# Patient Record
Sex: MALE | Race: WHITE | Employment: FULL TIME | URBAN - METROPOLITAN AREA
[De-identification: names, ages, dates, MRNs, and addresses within clinical notes are randomized per-mention and may not be internally consistent; named-entity substitution may affect disease eponyms.]

---
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
## 2019-04-01 ENCOUNTER — OFFICE VISIT (OUTPATIENT)
Dept: FAMILY MEDICINE | Facility: CLINIC | Age: 34
End: 2019-04-01
Payer: COMMERCIAL

## 2019-04-01 VITALS
TEMPERATURE: 96.5 F | HEART RATE: 74 BPM | SYSTOLIC BLOOD PRESSURE: 118 MMHG | DIASTOLIC BLOOD PRESSURE: 78 MMHG | RESPIRATION RATE: 20 BRPM | OXYGEN SATURATION: 98 % | BODY MASS INDEX: 28.79 KG/M2 | HEIGHT: 73 IN | WEIGHT: 217.2 LBS

## 2019-04-01 DIAGNOSIS — Z00.00 WELL ADULT EXAM: Primary | ICD-10-CM

## 2019-04-01 DIAGNOSIS — Z71.6 ENCOUNTER FOR TOBACCO USE CESSATION COUNSELING: ICD-10-CM

## 2019-04-01 DIAGNOSIS — Z63.8 STRESS DUE TO FAMILY TENSION: ICD-10-CM

## 2019-04-01 DIAGNOSIS — F43.22 ADJUSTMENT DISORDER WITH ANXIOUS MOOD: ICD-10-CM

## 2019-04-01 LAB
ALBUMIN SERPL-MCNC: 3.7 G/DL (ref 3.4–5)
ALP SERPL-CCNC: 73 U/L (ref 40–150)
ALT SERPL W P-5'-P-CCNC: 17 U/L (ref 0–70)
ANION GAP SERPL CALCULATED.3IONS-SCNC: 5 MMOL/L (ref 3–14)
AST SERPL W P-5'-P-CCNC: 19 U/L (ref 0–45)
BILIRUB SERPL-MCNC: 0.4 MG/DL (ref 0.2–1.3)
BUN SERPL-MCNC: 8 MG/DL (ref 7–30)
CALCIUM SERPL-MCNC: 8.7 MG/DL (ref 8.5–10.1)
CHLORIDE SERPL-SCNC: 109 MMOL/L (ref 94–109)
CHOLEST SERPL-MCNC: 157 MG/DL
CO2 SERPL-SCNC: 27 MMOL/L (ref 20–32)
CREAT SERPL-MCNC: 0.97 MG/DL (ref 0.66–1.25)
GFR SERPL CREATININE-BSD FRML MDRD: >90 ML/MIN/{1.73_M2}
GLUCOSE SERPL-MCNC: 92 MG/DL (ref 70–99)
HDLC SERPL-MCNC: 28 MG/DL
LDLC SERPL CALC-MCNC: 93 MG/DL
NONHDLC SERPL-MCNC: 129 MG/DL
POTASSIUM SERPL-SCNC: 3.8 MMOL/L (ref 3.4–5.3)
PROT SERPL-MCNC: 6.5 G/DL (ref 6.8–8.8)
SODIUM SERPL-SCNC: 141 MMOL/L (ref 133–144)
TRIGL SERPL-MCNC: 180 MG/DL

## 2019-04-01 PROCEDURE — 36415 COLL VENOUS BLD VENIPUNCTURE: CPT | Performed by: FAMILY MEDICINE

## 2019-04-01 PROCEDURE — 99395 PREV VISIT EST AGE 18-39: CPT | Performed by: FAMILY MEDICINE

## 2019-04-01 PROCEDURE — 80053 COMPREHEN METABOLIC PANEL: CPT | Performed by: FAMILY MEDICINE

## 2019-04-01 PROCEDURE — 80061 LIPID PANEL: CPT | Performed by: FAMILY MEDICINE

## 2019-04-01 RX ORDER — NICOTINE 21 MG/24HR
1 PATCH, TRANSDERMAL 24 HOURS TRANSDERMAL EVERY 24 HOURS
Qty: 21 PATCH | Refills: 1 | Status: SHIPPED | OUTPATIENT
Start: 2019-04-01

## 2019-04-01 RX ORDER — FAMOTIDINE 20 MG/1
20 TABLET, FILM COATED ORAL 2 TIMES DAILY PRN
COMMUNITY

## 2019-04-01 SDOH — SOCIAL STABILITY - SOCIAL INSECURITY: OTHER SPECIFIED PROBLEMS RELATED TO PRIMARY SUPPORT GROUP: Z63.8

## 2019-04-01 SDOH — HEALTH STABILITY: MENTAL HEALTH: HOW OFTEN DO YOU HAVE A DRINK CONTAINING ALCOHOL?: NEVER

## 2019-04-01 ASSESSMENT — ENCOUNTER SYMPTOMS
DIARRHEA: 0
HEMATOCHEZIA: 0
CHILLS: 0
DYSURIA: 0
EYE PAIN: 0
HEARTBURN: 1
FEVER: 1
HEMATURIA: 0
WEAKNESS: 0
CONSTIPATION: 0
SORE THROAT: 0
DIZZINESS: 0
ARTHRALGIAS: 0
HEADACHES: 0
JOINT SWELLING: 0
FREQUENCY: 0
MYALGIAS: 0
PALPITATIONS: 0
COUGH: 0
SHORTNESS OF BREATH: 0
ABDOMINAL PAIN: 0
NERVOUS/ANXIOUS: 1
NAUSEA: 0

## 2019-04-01 ASSESSMENT — ANXIETY QUESTIONNAIRES
6. BECOMING EASILY ANNOYED OR IRRITABLE: NOT AT ALL
5. BEING SO RESTLESS THAT IT IS HARD TO SIT STILL: SEVERAL DAYS
2. NOT BEING ABLE TO STOP OR CONTROL WORRYING: NEARLY EVERY DAY
IF YOU CHECKED OFF ANY PROBLEMS ON THIS QUESTIONNAIRE, HOW DIFFICULT HAVE THESE PROBLEMS MADE IT FOR YOU TO DO YOUR WORK, TAKE CARE OF THINGS AT HOME, OR GET ALONG WITH OTHER PEOPLE: NOT DIFFICULT AT ALL
1. FEELING NERVOUS, ANXIOUS, OR ON EDGE: NEARLY EVERY DAY
3. WORRYING TOO MUCH ABOUT DIFFERENT THINGS: NEARLY EVERY DAY
7. FEELING AFRAID AS IF SOMETHING AWFUL MIGHT HAPPEN: SEVERAL DAYS
GAD7 TOTAL SCORE: 13

## 2019-04-01 ASSESSMENT — PATIENT HEALTH QUESTIONNAIRE - PHQ9
SUM OF ALL RESPONSES TO PHQ QUESTIONS 1-9: 10
5. POOR APPETITE OR OVEREATING: MORE THAN HALF THE DAYS

## 2019-04-01 ASSESSMENT — MIFFLIN-ST. JEOR: SCORE: 1974.59

## 2019-04-01 NOTE — LETTER
[facility]  [address] [place] [address]    April 3, 2019    Kendrick Sosa  [address] [place] [id number]          Dear Kendrick,    Your most recent labs are not concerning at this time.  Your Liver function, kidney function and electrolytes are all normal.  You have a healthy cholesterol panel as your LDL (bad cholesterol) is below 100, however your HDL (good cholesterol) is well below 45.  Raise your good cholesterol by increasing your fiber intake, omega-3/6 fatty acids, and get regular exercise.  Please continue your healthy diet and regular exercise regimen discussed in clinic, and please let me know if you have any questions    Enclosed is a copy of your results.     Results for orders placed or performed in visit on 04/01/19   Lipid panel reflex to direct LDL Fasting   Result Value Ref Range    Cholesterol 157 <200 mg/dL    Triglycerides 180 (H) <150 mg/dL    HDL Cholesterol 28 (L) >39 mg/dL    LDL Cholesterol Calculated 93 <100 mg/dL    Non HDL Cholesterol 129 <130 mg/dL   Comprehensive metabolic panel   Result Value Ref Range    Sodium 141 133 - 144 mmol/L    Potassium 3.8 3.4 - 5.3 mmol/L    Chloride 109 94 - 109 mmol/L    Carbon Dioxide 27 20 - 32 mmol/L    Anion Gap 5 3 - 14 mmol/L    Glucose 92 70 - 99 mg/dL    Urea Nitrogen 8 7 - 30 mg/dL    Creatinine 0.97 0.66 - 1.25 mg/dL    GFR Estimate >90 >60 mL/min/[1.73_m2]    GFR Estimate If Black >90 >60 mL/min/[1.73_m2]    Calcium 8.7 8.5 - 10.1 mg/dL    Bilirubin Total 0.4 0.2 - 1.3 mg/dL    Albumin 3.7 3.4 - 5.0 g/dL    Protein Total 6.5 (L) 6.8 - 8.8 g/dL    Alkaline Phosphatase 73 40 - 150 U/L    ALT 17 0 - 70 U/L    AST 19 0 - 45 U/L       If you have any questions or concerns, please call myself or my nurse at [phone].      Sincerely,        Darrion Rivera MD/patel
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
## 2019-04-02 ASSESSMENT — ANXIETY QUESTIONNAIRES: GAD7 TOTAL SCORE: 13

## 2019-05-29 ENCOUNTER — OFFICE VISIT (OUTPATIENT)
Dept: PSYCHOLOGY | Facility: CLINIC | Age: 34
End: 2019-05-29
Attending: FAMILY MEDICINE
Payer: COMMERCIAL

## 2019-05-29 DIAGNOSIS — F43.22 ADJUSTMENT DISORDER WITH ANXIOUS MOOD: Primary | ICD-10-CM

## 2019-05-29 PROCEDURE — 90791 PSYCH DIAGNOSTIC EVALUATION: CPT | Performed by: MARRIAGE & FAMILY THERAPIST
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
## 2019-05-29 NOTE — Clinical Note
Sens, Dr. Rivera!  I met with Kendrick for Diagnostic Assessment/therapy intake.  Plan for therapy will be to focus on increasing calm mindset, along with likely (re-)processing of past loss/trauma.  Thank you for the referral!

## 2019-06-05 ENCOUNTER — OFFICE VISIT (OUTPATIENT)
Dept: PSYCHOLOGY | Facility: CLINIC | Age: 34
End: 2019-06-05
Attending: FAMILY MEDICINE
Payer: COMMERCIAL

## 2019-06-05 DIAGNOSIS — F43.22 ADJUSTMENT DISORDER WITH ANXIOUS MOOD: Primary | ICD-10-CM

## 2019-06-05 PROCEDURE — 90834 PSYTX W PT 45 MINUTES: CPT | Performed by: MARRIAGE & FAMILY THERAPIST

## 2019-06-05 ASSESSMENT — ANXIETY QUESTIONNAIRES
1. FEELING NERVOUS, ANXIOUS, OR ON EDGE: SEVERAL DAYS
7. FEELING AFRAID AS IF SOMETHING AWFUL MIGHT HAPPEN: SEVERAL DAYS
6. BECOMING EASILY ANNOYED OR IRRITABLE: NOT AT ALL
3. WORRYING TOO MUCH ABOUT DIFFERENT THINGS: SEVERAL DAYS
IF YOU CHECKED OFF ANY PROBLEMS ON THIS QUESTIONNAIRE, HOW DIFFICULT HAVE THESE PROBLEMS MADE IT FOR YOU TO DO YOUR WORK, TAKE CARE OF THINGS AT HOME, OR GET ALONG WITH OTHER PEOPLE: NOT DIFFICULT AT ALL
2. NOT BEING ABLE TO STOP OR CONTROL WORRYING: SEVERAL DAYS
GAD7 TOTAL SCORE: 5
5. BEING SO RESTLESS THAT IT IS HARD TO SIT STILL: SEVERAL DAYS

## 2019-06-05 ASSESSMENT — PATIENT HEALTH QUESTIONNAIRE - PHQ9
5. POOR APPETITE OR OVEREATING: NOT AT ALL
SUM OF ALL RESPONSES TO PHQ QUESTIONS 1-9: 2

## 2019-06-05 NOTE — PROGRESS NOTES
Progress Note    Client Name: Kendrick Sosa  Date: 6/5/19         Service Type: Individual  Video Visit: No     Session Start Time: 11:00  Session End Time: 11:52     Session Length: 38-52    Session #: 1    Attendees: Client attended alone     Treatment Plan Last Reviewed: 6/5/19, next due 9/5/19.  PHQ-9 / MILAD-7 : completed.    DATA  Interactive Complexity: No  Crisis: No       Progress Since Last Session (Related to Symptoms / Goals / Homework):   Symptoms: Worsening client reported increasing anxiety.    Homework: Achieved / completed to satisfaction  Completed in session      Episode of Care Goals: Minimal progress - PREPARATION (Decided to change - considering how); Intervened by negotiating a change plan and determining options / strategies for behavior change, identifying triggers, exploring social supports, and working towards setting a date to begin behavior change     Current / Ongoing Stressors and Concerns:   Client reported that he was served divorce papers since last session.  Client reported that, although he wants a divorce, he is not financially ready to engage in the process now (which requires response within 30 days), and he is anxious about losing his assets and custody/visitation with his children.  Client reported that he is working on getting a job.     Treatment Objective(s) Addressed in This Session:   identify some stressors which contribute to feelings of anxiety  Client identified his desired therapy goals.     Intervention:   DBT: taught/reviewed with client focusing on being effective in his response to stress and doing what he can, especially when options are limited.  EMDR: administered Dissociative Experiences Scale (KENNY) assessment to determine client's readiness for engaging in EMDR therapy.  Motivational Interviewing: used circular/Socratic questioning to elicit client's identification of his desired therapy goals.        ASSESSMENT:  "Current Emotional / Mental Status (status of significant symptoms):   Risk status (Self / Other harm or suicidal ideation)   Client denies current fears or concerns for personal safety.   Client denies current or recent suicidal ideation or behaviors.   Client denies current or recent homicidal ideation or behaviors.   Client denies current or recent self injurious behavior or ideation.   Client denies other safety concerns.   Client Client reports there has been a change in risk factors since their last session.  served divorce papers   Client Client reports there has been no change in protective factors since their last session.     A safety and risk management plan has not been developed at this time, however client was given the after-hours number / 911 should there be a change in any of these risk factors.     Appearance:   Appropriate    Eye Contact:   Good    Psychomotor Behavior: Normal    Attitude:   Cooperative    Orientation:   All   Speech    Rate / Production: Normal  Pressured     Volume:  Normal    Mood:    Anxious  Normal   Affect:    Appropriate  Worrisome    Thought Content:  Clear    Thought Form:  Coherent  Logical    Insight:    Fair      Medication Review:   No current psychiatric medications prescribed     Medication Compliance:   NA     Changes in Health Issues:   None reported     Chemical Use Review:   Substance Use: Chemical use reviewed, no active concerns identified      Tobacco Use: Yes, decrease.  Client reports frequency of use \"less cigarettes\". Action  Provided support and affirmation for steps taken towards quiting     Diagnosis:  1. Adjustment disorder with anxious mood        Collateral Reports Completed:   Not Applicable    PLAN: (Client Tasks / Therapist Tasks / Other)  Client agreed to continue to focus on effectively doing what he can in response to stressors with limited options and to complete KENNY assessment to determine his readiness for engaging in EMDR therapy between " now and follow-up session next week.        Cali Navarro, LMFT                                                         ______________________________________________________________________    Treatment Plan    Client's Name: Kendrick Sosa  YOB: 1985    Date: 6/5/19    DSM-V Diagnoses: Adjustment Disorders  309.24 (F43.22) With anxiety  Psychosocial / Contextual Factors: going through divorce from wife, history of loss/trauma, unemployed/no income    WHODAS: 15    Referral / Collaboration:  Referral to another professional/service is not indicated at this time..    Anticipated number of session or this episode of care: 11-20      MeasurableTreatment Goal(s) related to diagnosis / functional impairment(s)  Goal 1: Client will successfully process through past trauma defined as reporting 0 Subjective Units of Distress related to trauma on 0-10 scale and 7 on Validity of (positive) Cognition scale about self on 1-7 scale   I will know I've met my goal when I feel less impacted by my past loss/trauma..       Objective #A (Client Action)    Status: New - Date: 6/5/19      Client will identify past traumatic events/memories which are causing current distress.     Intervention(s)  Therapist will take client's history and facilitate client's identification of targets for EMDR.     Objective #B  Client will complete needed assessment(s) and [facility] resourcing to confirm readiness for EMDR.    Status: New - Date: 6/5/19      Intervention(s)  Therapist will administer Dissociative Experiences Scale, Multidimensional Inventory of Dissociation as needed and complete [facility] resourcing with client.     Objective #C  Client will engage in installing at least 2 EMDR resources.  Status: New - Date: 6/5/19      Intervention(s)  Therapist will complete EMDR resourcing (Container, Remote Control, grounding/progressive muscle relaxation, Light Stream, Inner Advisor) with client.     Objective #D  (Client Action)    Status: New - Date: 6/5/19      Client will engage in reprocessing all past traumatic event/memory targets.     Intervention(s)   Therapist will complete EMDR reprocessing with client.    Goal 2: Client will increase overall baseline calm mindset by 2 points on a 1-10 Likert scale per self-report (10 = optimal calm mindset).    I will know I've met my goal when I feel less anxious.      Objective #A (Client Action)    Status: New - Date: 6/5/19     Client will use cognitive strategies identified in therapy to challenge anxious thoughts.    Intervention(s)  Therapist will teach emotional regulation skills. CBT/REBT ABCD model.    Objective #B  Client will use at least 2 new coping skills for anxiety management in the next 12 weeks.    Status: New - Date: 6/5/19     Intervention(s)  Therapist will teach emotional regulation skills. DBT Core Mindfulness, Distress Tolerance, Emotion Regulation, and Interpersonal Effectiveness skills.    Client has reviewed and agreed to the above plan.      YAN Hooker  June 5, 2019

## 2019-06-06 ASSESSMENT — ANXIETY QUESTIONNAIRES: GAD7 TOTAL SCORE: 5

## 2019-06-13 ENCOUNTER — OFFICE VISIT (OUTPATIENT)
Dept: PSYCHOLOGY | Facility: CLINIC | Age: 34
End: 2019-06-13
Payer: COMMERCIAL

## 2019-06-13 DIAGNOSIS — F43.22 ADJUSTMENT DISORDER WITH ANXIOUS MOOD: Primary | ICD-10-CM

## 2019-06-13 PROCEDURE — 90834 PSYTX W PT 45 MINUTES: CPT | Performed by: MARRIAGE & FAMILY THERAPIST

## 2019-06-13 ASSESSMENT — ANXIETY QUESTIONNAIRES
IF YOU CHECKED OFF ANY PROBLEMS ON THIS QUESTIONNAIRE, HOW DIFFICULT HAVE THESE PROBLEMS MADE IT FOR YOU TO DO YOUR WORK, TAKE CARE OF THINGS AT HOME, OR GET ALONG WITH OTHER PEOPLE: NOT DIFFICULT AT ALL
2. NOT BEING ABLE TO STOP OR CONTROL WORRYING: NOT AT ALL
1. FEELING NERVOUS, ANXIOUS, OR ON EDGE: NOT AT ALL
3. WORRYING TOO MUCH ABOUT DIFFERENT THINGS: NOT AT ALL
6. BECOMING EASILY ANNOYED OR IRRITABLE: NOT AT ALL
GAD7 TOTAL SCORE: 0
5. BEING SO RESTLESS THAT IT IS HARD TO SIT STILL: NOT AT ALL
7. FEELING AFRAID AS IF SOMETHING AWFUL MIGHT HAPPEN: NOT AT ALL

## 2019-06-13 ASSESSMENT — PATIENT HEALTH QUESTIONNAIRE - PHQ9
SUM OF ALL RESPONSES TO PHQ QUESTIONS 1-9: 1
5. POOR APPETITE OR OVEREATING: NOT AT ALL

## 2019-06-13 NOTE — PROGRESS NOTES
Progress Note    Client Name: Kendrick Sosa  Date: 6/13/19         Service Type: Individual  Video Visit: No     Session Start Time: 11:00  Session End Time: 11:52     Session Length: 38-52    Session #: 2    Attendees: Client attended alone     Treatment Plan Last Reviewed: 6/5/19, next due 9/5/19.  PHQ-9 / MILAD-7 : completed.    DATA  Interactive Complexity: No  Crisis: No       Progress Since Last Session (Related to Symptoms / Goals / Homework):   Symptoms: Improving client reported decreased PHQ-9 and MILAD-7 scores    Homework: Achieved / completed to satisfaction      Episode of Care Goals: Satisfactory progress - ACTION (Actively working towards change); Intervened by reinforcing change plan / affirming steps taken     Current / Ongoing Stressors and Concerns:   Client reported that he followed through with contacting a , and his is unsure if they will be able to help at this point.  Client reported that he has reconnected with an ex-girlfriend, and is unsure if he is rushing that.  Client reported that he got the job he applied for, and that he will have to transition back to working 2nd or 3rd shift.     Treatment Objective(s) Addressed in This Session:   identify some strategies to more effectively address stressors     Intervention:   DBT: taught/reviewed with client using Pros and Cons List skill to aid in his decision-making process regarding pursuing a relationship with his ex-girlfriend.  EMDR: oriented client to EMDR therapy protocol.        ASSESSMENT: Current Emotional / Mental Status (status of significant symptoms):   Risk status (Self / Other harm or suicidal ideation)   Client denies current fears or concerns for personal safety.   Client denies current or recent suicidal ideation or behaviors.   Client denies current or recent homicidal ideation or behaviors.   Client denies current or recent self injurious behavior or ideation.   Client denies  "other safety concerns.   Client Client reports there has been no change in risk factors since their last session.     Client Client reports there has been a chance in protective factors since their last session.  got new job   A safety and risk management plan has not been developed at this time, however client was given the after-hours number / 911 should there be a change in any of these risk factors.     Appearance:   Appropriate    Eye Contact:   Good    Psychomotor Behavior: Normal    Attitude:   Cooperative    Orientation:   All   Speech    Rate / Production: Normal     Volume:  Normal    Mood:    Anxious  Normal   Affect:    Appropriate  Worrisome    Thought Content:  Clear    Thought Form:  Coherent  Logical    Insight:    Fair      Medication Review:   No current psychiatric medications prescribed     Medication Compliance:   NA     Changes in Health Issues:   None reported     Chemical Use Review:   Substance Use: Chemical use reviewed, no active concerns identified      Tobacco Use: Yes, decrease.  Client reports frequency of use \"decreasing\". Action  Provided support and affirmation for steps taken towards quiting     Diagnosis:  1. Adjustment disorder with anxious mood        Collateral Reports Completed:   Not Applicable    PLAN: (Client Tasks / Therapist Tasks / Other)  Client agreed to complete Pros and Cons List skill to aid in his decision-making regarding pursuing relationship with ex-girlfriend between now and follow-up session next week.        Cali Navarro, LMFT                                                         ______________________________________________________________________    Treatment Plan    Client's Name: Kendrick Sosa  YOB: 1985    Date: 6/5/19    DSM-V Diagnoses: Adjustment Disorders  309.24 (F43.22) With anxiety  Psychosocial / Contextual Factors: going through divorce from wife, history of loss/trauma, unemployed/no income    WHODAS: 15    Referral / " Collaboration:  Referral to another professional/service is not indicated at this time..    Anticipated number of session or this episode of care: 11-20      MeasurableTreatment Goal(s) related to diagnosis / functional impairment(s)  Goal 1: Client will successfully process through past trauma defined as reporting 0 Subjective Units of Distress related to trauma on 0-10 scale and 7 on Validity of (positive) Cognition scale about self on 1-7 scale   I will know I've met my goal when I feel less impacted by my past loss/trauma..       Objective #A (Client Action)    Status: New - Date: 6/5/19      Client will identify past traumatic events/memories which are causing current distress.     Intervention(s)  Therapist will take client's history and facilitate client's identification of targets for EMDR.     Objective #B  Client will complete needed assessment(s) and [facility] resourcing to confirm readiness for EMDR.    Status: New - Date: 6/5/19      Intervention(s)  Therapist will administer Dissociative Experiences Scale, Multidimensional Inventory of Dissociation as needed and complete [facility] resourcing with client.     Objective #C  Client will engage in installing at least 2 EMDR resources.  Status: New - Date: 6/5/19      Intervention(s)  Therapist will complete EMDR resourcing (Container, Remote Control, grounding/progressive muscle relaxation, Light Stream, Inner Advisor) with client.     Objective #D (Client Action)    Status: New - Date: 6/5/19      Client will engage in reprocessing all past traumatic event/memory targets.     Intervention(s)   Therapist will complete EMDR reprocessing with client.    Goal 2: Client will increase overall baseline calm mindset by 2 points on a 1-10 Likert scale per self-report (10 = optimal calm mindset).    I will know I've met my goal when I feel less anxious.      Objective #A (Client Action)    Status: New - Date: 6/5/19     Client will use cognitive  strategies identified in therapy to challenge anxious thoughts.    Intervention(s)  Therapist will teach emotional regulation skills. CBT/REBT ABCD model.    Objective #B  Client will use at least 2 new coping skills for anxiety management in the next 12 weeks.    Status: New - Date: 6/5/19     Intervention(s)  Therapist will teach emotional regulation skills. DBT Core Mindfulness, Distress Tolerance, Emotion Regulation, and Interpersonal Effectiveness skills.    Client has reviewed and agreed to the above plan.      Cali Navarro, LMFT  June 13, 2019

## 2019-06-14 ASSESSMENT — ANXIETY QUESTIONNAIRES: GAD7 TOTAL SCORE: 0

## 2019-06-20 ENCOUNTER — OFFICE VISIT (OUTPATIENT)
Dept: PSYCHOLOGY | Facility: CLINIC | Age: 34
End: 2019-06-20
Payer: COMMERCIAL

## 2019-06-20 DIAGNOSIS — F43.22 ADJUSTMENT DISORDER WITH ANXIOUS MOOD: Primary | ICD-10-CM

## 2019-06-20 PROCEDURE — 90834 PSYTX W PT 45 MINUTES: CPT | Performed by: MARRIAGE & FAMILY THERAPIST

## 2019-06-20 ASSESSMENT — ANXIETY QUESTIONNAIRES
7. FEELING AFRAID AS IF SOMETHING AWFUL MIGHT HAPPEN: NOT AT ALL
2. NOT BEING ABLE TO STOP OR CONTROL WORRYING: SEVERAL DAYS
3. WORRYING TOO MUCH ABOUT DIFFERENT THINGS: NOT AT ALL
GAD7 TOTAL SCORE: 3
5. BEING SO RESTLESS THAT IT IS HARD TO SIT STILL: NOT AT ALL
6. BECOMING EASILY ANNOYED OR IRRITABLE: SEVERAL DAYS
1. FEELING NERVOUS, ANXIOUS, OR ON EDGE: SEVERAL DAYS
IF YOU CHECKED OFF ANY PROBLEMS ON THIS QUESTIONNAIRE, HOW DIFFICULT HAVE THESE PROBLEMS MADE IT FOR YOU TO DO YOUR WORK, TAKE CARE OF THINGS AT HOME, OR GET ALONG WITH OTHER PEOPLE: NOT DIFFICULT AT ALL

## 2019-06-20 ASSESSMENT — PATIENT HEALTH QUESTIONNAIRE - PHQ9
5. POOR APPETITE OR OVEREATING: NOT AT ALL
SUM OF ALL RESPONSES TO PHQ QUESTIONS 1-9: 0

## 2019-06-20 NOTE — PROGRESS NOTES
Progress Note    Client Name: Kendrick Sosa  Date: 6/20/19         Service Type: Individual  Video Visit: No     Session Start Time: 11:17  Session End Time: 11:55     Session Length: 38-52    Session #: 3    Attendees: Client attended alone     Treatment Plan Last Reviewed: 6/5/19, next due 9/5/19.  PHQ-9 / MILAD-7 : completed.    DATA  Interactive Complexity: No  Crisis: No       Progress Since Last Session (Related to Symptoms / Goals / Homework):   Symptoms: Client reported decreased PHQ-9 and increased MILAD-7 scores    Homework: Achieved / completed to satisfaction      Episode of Care Goals: Minimal progress - ACTION (Actively working towards change); Intervened by reinforcing change plan / affirming steps taken     Current / Ongoing Stressors and Concerns:   Client reported that he never heard back from the  he contacted, so he is feeling stressed about having to complete he response to his wife's divorce filing this weekend.  Client reported that he completed his Pros and Cons List skill regarding moving forward with his ex-girlfriend, and that they have agreed to take it slow.  Client reported that he is very tired from his new job.     Treatment Objective(s) Addressed in This Session:   identify some strategies to more effectively address stressors     Intervention:   CBT: reviewed with client focusing on doing what he can (e.g. his legal response) and letting go of outcomes outside of his control.  Solution Focused: reviewed with client strategies for completing his divorce response with his mother's help and quitting smoking.        ASSESSMENT: Current Emotional / Mental Status (status of significant symptoms):   Risk status (Self / Other harm or suicidal ideation)   Client denies current fears or concerns for personal safety.   Client denies current or recent suicidal ideation or behaviors.   Client denies current or recent homicidal ideation or  "behaviors.   Client denies current or recent self injurious behavior or ideation.   Client denies other safety concerns.   Client Client reports there has been no change in risk factors since their last session.     Client Client reports there has been a chance in protective factors since their last session.  got new job   A safety and risk management plan has not been developed at this time, however client was given the after-hours number / 911 should there be a change in any of these risk factors.     Appearance:   Appropriate    Eye Contact:   Good    Psychomotor Behavior: Normal    Attitude:   Cooperative    Orientation:   All   Speech    Rate / Production: Normal     Volume:  Normal    Mood:    Anxious  Normal   Affect:    Appropriate  Worrisome    Thought Content:  Clear    Thought Form:  Coherent  Logical    Insight:    Fair      Medication Review:   No current psychiatric medications prescribed     Medication Compliance:   NA     Changes in Health Issues:   None reported     Chemical Use Review:   Substance Use: Chemical use reviewed, no active concerns identified      Tobacco Use: Yes, decrease.  Client reports frequency of use \"decreasing\". Action  Provided support and affirmation for steps taken towards quiting     Diagnosis:  1. Adjustment disorder with anxious mood        Collateral Reports Completed:   Not Applicable    PLAN: (Client Tasks / Therapist Tasks / Other)  Client agreed to work on doing what he can (e.g. responding to divorce filing with his mother's help) and quitting smoking between now and follow-up session next week.        Cali Navarro LMFT                                                         ______________________________________________________________________    Treatment Plan    Client's Name: Kendrick Sosa  YOB: 1985    Date: 6/5/19    DSM-V Diagnoses: Adjustment Disorders  309.24 (F43.22) With anxiety  Psychosocial / Contextual Factors: going through " divorce from wife, history of loss/trauma, unemployed/no income    WHODAS: 15    Referral / Collaboration:  Referral to another professional/service is not indicated at this time..    Anticipated number of session or this episode of care: 11-20      MeasurableTreatment Goal(s) related to diagnosis / functional impairment(s)  Goal 1: Client will successfully process through past trauma defined as reporting 0 Subjective Units of Distress related to trauma on 0-10 scale and 7 on Validity of (positive) Cognition scale about self on 1-7 scale   I will know I've met my goal when I feel less impacted by my past loss/trauma..       Objective #A (Client Action)    Status: New - Date: 6/5/19      Client will identify past traumatic events/memories which are causing current distress.     Intervention(s)  Therapist will take client's history and facilitate client's identification of targets for EMDR.     Objective #B  Client will complete needed assessment(s) and [facility] resourcing to confirm readiness for EMDR.    Status: New - Date: 6/5/19      Intervention(s)  Therapist will administer Dissociative Experiences Scale, Multidimensional Inventory of Dissociation as needed and complete [facility] EM[facility] resourcing with client.     Objective #C  Client will engage in installing at least 2 EMDR resources.  Status: New - Date: 6/5/19      Intervention(s)  Therapist will complete EMDR resourcing (Container, Remote Control, grounding/progressive muscle relaxation, Light Stream, Inner Advisor) with client.     Objective #D (Client Action)    Status: New - Date: 6/5/19      Client will engage in reprocessing all past traumatic event/memory targets.     Intervention(s)   Therapist will complete EMDR reprocessing with client.    Goal 2: Client will increase overall baseline calm mindset by 2 points on a 1-10 Likert scale per self-report (10 = optimal calm mindset).    I will know I've met my goal when I feel less anxious.       Objective #A (Client Action)    Status: New - Date: 6/5/19     Client will use cognitive strategies identified in therapy to challenge anxious thoughts.    Intervention(s)  Therapist will teach emotional regulation skills. CBT/REBT ABCD model.    Objective #B  Client will use at least 2 new coping skills for anxiety management in the next 12 weeks.    Status: New - Date: 6/5/19     Intervention(s)  Therapist will teach emotional regulation skills. DBT Core Mindfulness, Distress Tolerance, Emotion Regulation, and Interpersonal Effectiveness skills.    Client has reviewed and agreed to the above plan.      Cali Navarro, LMFT  June 20, 2019

## 2019-06-21 ASSESSMENT — ANXIETY QUESTIONNAIRES: GAD7 TOTAL SCORE: 3

## 2019-06-24 ENCOUNTER — OFFICE VISIT (OUTPATIENT)
Dept: PSYCHOLOGY | Facility: CLINIC | Age: 34
End: 2019-06-24
Payer: COMMERCIAL

## 2019-06-24 DIAGNOSIS — F43.22 ADJUSTMENT DISORDER WITH ANXIOUS MOOD: Primary | ICD-10-CM

## 2019-06-24 PROCEDURE — 90785 PSYTX COMPLEX INTERACTIVE: CPT | Performed by: MARRIAGE & FAMILY THERAPIST

## 2019-06-24 PROCEDURE — 90834 PSYTX W PT 45 MINUTES: CPT | Performed by: MARRIAGE & FAMILY THERAPIST

## 2019-06-24 ASSESSMENT — ANXIETY QUESTIONNAIRES
3. WORRYING TOO MUCH ABOUT DIFFERENT THINGS: NOT AT ALL
6. BECOMING EASILY ANNOYED OR IRRITABLE: NOT AT ALL
1. FEELING NERVOUS, ANXIOUS, OR ON EDGE: NOT AT ALL
GAD7 TOTAL SCORE: 0
5. BEING SO RESTLESS THAT IT IS HARD TO SIT STILL: NOT AT ALL
2. NOT BEING ABLE TO STOP OR CONTROL WORRYING: NOT AT ALL
7. FEELING AFRAID AS IF SOMETHING AWFUL MIGHT HAPPEN: NOT AT ALL

## 2019-06-24 ASSESSMENT — PATIENT HEALTH QUESTIONNAIRE - PHQ9
SUM OF ALL RESPONSES TO PHQ QUESTIONS 1-9: 0
5. POOR APPETITE OR OVEREATING: NOT AT ALL

## 2019-06-24 NOTE — PROGRESS NOTES
Progress Note    Client Name: Kendrick Sosa  Date: 6/24/19         Service Type: Individual  Video Visit: No     Session Start Time: 12:00  Session End Time: 12:47     Session Length: 38-52    Session #: 4    Attendees: Client attended alone     Treatment Plan Last Reviewed: 6/5/19, next due 9/5/19.  PHQ-9 / MILAD-7 : completed.    DATA  Interactive Complexity: Yes, visit entailed Interactive Complexity evidenced by:  -Use of play equipment, physical devices,  or  to overcome barriers to diagnostic or therapeutic interaction with a patient who is not fluent in the same language or who has not developed or lost expressive or receptive language skills to use or understand typical language  Crisis: No       Progress Since Last Session (Related to Symptoms / Goals / Homework):   Symptoms: Improving Client reported decreased MILAD-7 score    Homework: Achieved / completed to satisfaction      Episode of Care Goals: Satisfactory progress - ACTION (Actively working towards change); Intervened by reinforcing change plan / affirming steps taken        Current / Ongoing Stressors and Concerns:   Client reported that he completed his response to his wife's divorce filing this past weekend with his mother's help, about which he feels anxious as he doesn't know if he did it correctly.  Client reported that he is anxious about looking forward to having to face his ex-wife in court, but recognizes the need to do so for his kids' sakes.  Client opted to engage in EMDR Container resource installation during today's session.     Treatment Objective(s) Addressed in This Session:   Client will complete needed assessment(s) and [facility] EM[facility] resourcing to confirm readiness for EMDR.     Intervention:   EMDR: installed [facility] resource.        ASSESSMENT: Current Emotional / Mental Status (status of significant symptoms):   Risk status (Self / Other harm or suicidal  "ideation)   Client denies current fears or concerns for personal safety.   Client denies current or recent suicidal ideation or behaviors.   Client denies current or recent homicidal ideation or behaviors.   Client denies current or recent self injurious behavior or ideation.   Client denies other safety concerns.   Client Client reports there has been no change in risk factors since their last session.     Client Client reports there has been a chance in protective factors since their last session.  got new job   A safety and risk management plan has not been developed at this time, however client was given the after-hours number / 911 should there be a change in any of these risk factors.     Appearance:   Appropriate    Eye Contact:   Good    Psychomotor Behavior: Normal    Attitude:   Cooperative    Orientation:   All   Speech    Rate / Production: Normal     Volume:  Normal    Mood:    Anxious  Normal   Affect:    Appropriate  Worrisome    Thought Content:  Clear    Thought Form:  Coherent  Logical    Insight:    Fair      Medication Review:   No current psychiatric medications prescribed     Medication Compliance:   NA     Changes in Health Issues:   None reported     Chemical Use Review:   Substance Use: Chemical use reviewed, no active concerns identified      Tobacco Use: Yes, decrease.  Client reports frequency of use \"decreasing\". Action  Provided support and affirmation for steps taken towards quiting     Diagnosis:  1. Adjustment disorder with anxious mood        Collateral Reports Completed:   Not Applicable    PLAN: (Client Tasks / Therapist Tasks / Other)  Client agreed to engage in EMDR [facility] resourcing at least once daily and additionally as needed for distress between now and follow-up session next week.        Cali Navarro, LMFT                                                         ______________________________________________________________________    Treatment " Plan    Client's Name: Kendrick Sosa  YOB: 1985    Date: 6/5/19    DSM-V Diagnoses: Adjustment Disorders  309.24 (F43.22) With anxiety  Psychosocial / Contextual Factors: going through divorce from wife, history of loss/trauma, unemployed/no income    WHODAS: 15    Referral / Collaboration:  Referral to another professional/service is not indicated at this time..    Anticipated number of session or this episode of care: 11-20      MeasurableTreatment Goal(s) related to diagnosis / functional impairment(s)  Goal 1: Client will successfully process through past trauma defined as reporting 0 Subjective Units of Distress related to trauma on 0-10 scale and 7 on Validity of (positive) Cognition scale about self on 1-7 scale   I will know I've met my goal when I feel less impacted by my past loss/trauma..       Objective #A (Client Action)    Status: New - Date: 6/5/19      Client will identify past traumatic events/memories which are causing current distress.     Intervention(s)  Therapist will take client's history and facilitate client's identification of targets for EMDR.     Objective #B  Client will complete needed assessment(s) and [facility] resourcing to confirm readiness for EMDR.    Status: New - Date: 6/5/19      Intervention(s)  Therapist will administer Dissociative Experiences Scale, Multidimensional Inventory of Dissociation as needed and complete [facility] EM[facility] resourcing with client.     Objective #C  Client will engage in installing at least 2 EMDR resources.  Status: New - Date: 6/5/19      Intervention(s)  Therapist will complete EMDR resourcing (Container, Remote Control, grounding/progressive muscle relaxation, Light Stream, Inner Advisor) with client.     Objective #D (Client Action)    Status: New - Date: 6/5/19      Client will engage in reprocessing all past traumatic event/memory targets.     Intervention(s)   Therapist will complete EMDR reprocessing with client.    Goal 2:  Client will increase overall baseline calm mindset by 2 points on a 1-10 Likert scale per self-report (10 = optimal calm mindset).    I will know I've met my goal when I feel less anxious.      Objective #A (Client Action)    Status: New - Date: 6/5/19     Client will use cognitive strategies identified in therapy to challenge anxious thoughts.    Intervention(s)  Therapist will teach emotional regulation skills. CBT/REBT ABCD model.    Objective #B  Client will use at least 2 new coping skills for anxiety management in the next 12 weeks.    Status: New - Date: 6/5/19     Intervention(s)  Therapist will teach emotional regulation skills. DBT Core Mindfulness, Distress Tolerance, Emotion Regulation, and Interpersonal Effectiveness skills.    Client has reviewed and agreed to the above plan.      YAN Hooker  June 23, 2019

## 2019-06-25 ASSESSMENT — ANXIETY QUESTIONNAIRES: GAD7 TOTAL SCORE: 0

## 2019-07-05 ENCOUNTER — OFFICE VISIT (OUTPATIENT)
Dept: PSYCHOLOGY | Facility: CLINIC | Age: 34
End: 2019-07-05
Payer: COMMERCIAL

## 2019-07-05 DIAGNOSIS — F43.22 ADJUSTMENT DISORDER WITH ANXIOUS MOOD: Primary | ICD-10-CM

## 2019-07-05 PROCEDURE — 90834 PSYTX W PT 45 MINUTES: CPT | Performed by: MARRIAGE & FAMILY THERAPIST

## 2019-07-05 PROCEDURE — 90785 PSYTX COMPLEX INTERACTIVE: CPT | Performed by: MARRIAGE & FAMILY THERAPIST

## 2019-07-05 ASSESSMENT — ANXIETY QUESTIONNAIRES
1. FEELING NERVOUS, ANXIOUS, OR ON EDGE: NOT AT ALL
3. WORRYING TOO MUCH ABOUT DIFFERENT THINGS: NOT AT ALL
2. NOT BEING ABLE TO STOP OR CONTROL WORRYING: NOT AT ALL
GAD7 TOTAL SCORE: 0
6. BECOMING EASILY ANNOYED OR IRRITABLE: NOT AT ALL
7. FEELING AFRAID AS IF SOMETHING AWFUL MIGHT HAPPEN: NOT AT ALL
5. BEING SO RESTLESS THAT IT IS HARD TO SIT STILL: NOT AT ALL

## 2019-07-05 ASSESSMENT — PATIENT HEALTH QUESTIONNAIRE - PHQ9
5. POOR APPETITE OR OVEREATING: NOT AT ALL
SUM OF ALL RESPONSES TO PHQ QUESTIONS 1-9: 0

## 2019-07-05 NOTE — PROGRESS NOTES
"                                           Progress Note    Client Name: Kendrick Sosa  Date: 7/5/19         Service Type: Individual  Video Visit: No     Session Start Time: 1:30  Session End Time: 2:15     Session Length: 38-52    Session #: 5    Attendees: Client attended alone     Treatment Plan Last Reviewed: 6/5/19, next due 9/5/19.  PHQ-9 / MILAD-7 : completed.    DATA  Interactive Complexity: Yes, visit entailed Interactive Complexity evidenced by:  -Use of play equipment, physical devices,  or  to overcome barriers to diagnostic or therapeutic interaction with a patient who is not fluent in the same language or who has not developed or lost expressive or receptive language skills to use or understand typical language  Crisis: No       Progress Since Last Session (Related to Symptoms / Goals / Homework):   Symptoms: No change client is stable    Homework: Achieved / completed to satisfaction      Episode of Care Goals: Satisfactory progress - ACTION (Actively working towards change); Intervened by reinforcing change plan / affirming steps taken        Current / Ongoing Stressors and Concerns:   Client reported that he is planning to try and settle his divorce \"in a fair manner.\"  Client reported that he has had minimal contact with his kids, which has impacted him emotionally.  Client opted to engage in EMDR Container resource installation during today's session.     Treatment Objective(s) Addressed in This Session:   Client will engage in installing at least 2 EMDR resources.     Intervention:   EMDR: installed Container resource.        ASSESSMENT: Current Emotional / Mental Status (status of significant symptoms):   Risk status (Self / Other harm or suicidal ideation)   Client denies current fears or concerns for personal safety.   Client denies current or recent suicidal ideation or behaviors.   Client denies current or recent homicidal ideation or behaviors.   Client denies current or " recent self injurious behavior or ideation.   Client denies other safety concerns.   Client Client reports there has been no change in risk factors since their last session.     Client Client reports there has been a chance in protective factors since their last session.  got new job   A safety and risk management plan has not been developed at this time, however client was given the after-hours number / 911 should there be a change in any of these risk factors.     Appearance:   Appropriate    Eye Contact:   Good    Psychomotor Behavior: Normal    Attitude:   Cooperative    Orientation:   All   Speech    Rate / Production: Normal     Volume:  Normal    Mood:    Anxious  Normal   Affect:    Appropriate  Worrisome    Thought Content:  Clear    Thought Form:  Coherent  Logical    Insight:    Fair      Medication Review:   No current psychiatric medications prescribed     Medication Compliance:   NA     Changes in Health Issues:   None reported     Chemical Use Review:   Substance Use: Chemical use reviewed, no active concerns identified      Tobacco Use: No change in amount of tobacco use since last session.  Action    Diagnosis:  1. Adjustment disorder with anxious mood        Collateral Reports Completed:   Not Applicable    PLAN: (Client Tasks / Therapist Tasks / Other)  Client agreed to engage in EMDR Container resourcing at least once daily and additionally as needed for distress between now and follow-up session next week.        Cali Navarro, [facility]                                                         ______________________________________________________________________    Treatment Plan    Client's Name: Kendrick Sosa  YOB: 1985    Date: 6/5/19    DSM-V Diagnoses: Adjustment Disorders  309.24 (F43.22) With anxiety  Psychosocial / Contextual Factors: going through divorce from wife, history of loss/trauma, unemployed/no income    WHODAS: 15    Referral / Collaboration:  Referral to  another professional/service is not indicated at this time..    Anticipated number of session or this episode of care: 11-20      MeasurableTreatment Goal(s) related to diagnosis / functional impairment(s)  Goal 1: Client will successfully process through past trauma defined as reporting 0 Subjective Units of Distress related to trauma on 0-10 scale and 7 on Validity of (positive) Cognition scale about self on 1-7 scale   I will know I've met my goal when I feel less impacted by my past loss/trauma..       Objective #A (Client Action)    Status: New - Date: 6/5/19      Client will identify past traumatic events/memories which are causing current distress.     Intervention(s)  Therapist will take client's history and facilitate client's identification of targets for EMDR.     Objective #B  Client will complete needed assessment(s) and [facility] EM[facility] resourcing to confirm readiness for EMDR.    Status: New - Date: 6/5/19      Intervention(s)  Therapist will administer Dissociative Experiences Scale, Multidimensional Inventory of Dissociation as needed and complete [facility] EMDR resourcing with client.     Objective #C  Client will engage in installing at least 2 EMDR resources.  Status: New - Date: 6/5/19      Intervention(s)  Therapist will complete EMDR resourcing (Container, Remote Control, grounding/progressive muscle relaxation, Light Stream, Inner Advisor) with client.     Objective #D (Client Action)    Status: New - Date: 6/5/19      Client will engage in reprocessing all past traumatic event/memory targets.     Intervention(s)   Therapist will complete EMDR reprocessing with client.    Goal 2: Client will increase overall baseline calm mindset by 2 points on a 1-10 Likert scale per self-report (10 = optimal calm mindset).    I will know I've met my goal when I feel less anxious.      Objective #A (Client Action)    Status: New - Date: 6/5/19     Client will use cognitive strategies identified in therapy to  challenge anxious thoughts.    Intervention(s)  Therapist will teach emotional regulation skills. CBT/REBT ABCD model.    Objective #B  Client will use at least 2 new coping skills for anxiety management in the next 12 weeks.    Status: New - Date: 6/5/19     Intervention(s)  Therapist will teach emotional regulation skills. DBT Core Mindfulness, Distress Tolerance, Emotion Regulation, and Interpersonal Effectiveness skills.    Client has reviewed and agreed to the above plan.      YAN Hooker  July 5, 2019

## 2019-07-06 ASSESSMENT — ANXIETY QUESTIONNAIRES: GAD7 TOTAL SCORE: 0

## 2019-07-11 ENCOUNTER — OFFICE VISIT (OUTPATIENT)
Dept: PSYCHOLOGY | Facility: CLINIC | Age: 34
End: 2019-07-11
Payer: COMMERCIAL

## 2019-07-11 DIAGNOSIS — F43.22 ADJUSTMENT DISORDER WITH ANXIOUS MOOD: Primary | ICD-10-CM

## 2019-07-11 PROCEDURE — 90834 PSYTX W PT 45 MINUTES: CPT | Performed by: MARRIAGE & FAMILY THERAPIST

## 2019-07-11 ASSESSMENT — ANXIETY QUESTIONNAIRES
GAD7 TOTAL SCORE: 0
1. FEELING NERVOUS, ANXIOUS, OR ON EDGE: NOT AT ALL
3. WORRYING TOO MUCH ABOUT DIFFERENT THINGS: NOT AT ALL
GAD7 TOTAL SCORE: 0
6. BECOMING EASILY ANNOYED OR IRRITABLE: NOT AT ALL
7. FEELING AFRAID AS IF SOMETHING AWFUL MIGHT HAPPEN: NOT AT ALL
4. TROUBLE RELAXING: NOT AT ALL
7. FEELING AFRAID AS IF SOMETHING AWFUL MIGHT HAPPEN: NOT AT ALL
5. BEING SO RESTLESS THAT IT IS HARD TO SIT STILL: NOT AT ALL
GAD7 TOTAL SCORE: 0
2. NOT BEING ABLE TO STOP OR CONTROL WORRYING: NOT AT ALL

## 2019-07-11 ASSESSMENT — PATIENT HEALTH QUESTIONNAIRE - PHQ9
SUM OF ALL RESPONSES TO PHQ QUESTIONS 1-9: 0
10. IF YOU CHECKED OFF ANY PROBLEMS, HOW DIFFICULT HAVE THESE PROBLEMS MADE IT FOR YOU TO DO YOUR WORK, TAKE CARE OF THINGS AT HOME, OR GET ALONG WITH OTHER PEOPLE: NOT DIFFICULT AT ALL
SUM OF ALL RESPONSES TO PHQ QUESTIONS 1-9: 0

## 2019-07-12 ASSESSMENT — ANXIETY QUESTIONNAIRES: GAD7 TOTAL SCORE: 0

## 2019-07-12 ASSESSMENT — PATIENT HEALTH QUESTIONNAIRE - PHQ9: SUM OF ALL RESPONSES TO PHQ QUESTIONS 1-9: 0

## 2019-07-12 NOTE — PROGRESS NOTES
Progress Note    Client Name: Kendrick Sosa  Date: 7/11/19         Service Type: Individual  Video Visit: No     Session Start Time: 1:00  Session End Time: 1:45     Session Length: 38-52    Session #: 6    Attendees: Client attended alone     Treatment Plan Last Reviewed: 6/5/19, next due 9/5/19.  PHQ-9 / MILAD-7 : completed.    DATA  Interactive Complexity: No  Crisis: No       Progress Since Last Session (Related to Symptoms / Goals / Homework):   Symptoms: No change client is stable    Homework: Partially completed      Episode of Care Goals: Satisfactory progress - ACTION (Actively working towards change); Intervened by reinforcing change plan / affirming steps taken        Current / Ongoing Stressors and Concerns:   Client reported being tired due to a busy work schedule.  Client reported that he is working with his wife's  to finalize his divorce.  Client reported that he wants to take his relationship with his girlfriend slowly.     Treatment Objective(s) Addressed in This Session:   identify some strategies to more effectively address stressors     Intervention:   Solution Focused: reviewed with client plan for finalizing his divorce in an effective manner.        ASSESSMENT: Current Emotional / Mental Status (status of significant symptoms):   Risk status (Self / Other harm or suicidal ideation)   Client denies current fears or concerns for personal safety.   Client denies current or recent suicidal ideation or behaviors.   Client denies current or recent homicidal ideation or behaviors.   Client denies current or recent self injurious behavior or ideation.   Client denies other safety concerns.   Client Client reports there has been no change in risk factors since their last session.     Client Patient reports there has been no change in protective factors since their last session.     A safety and risk management plan has not been developed at this time,  however client was given the after-hours number / 911 should there be a change in any of these risk factors.     Appearance:   Appropriate    Eye Contact:   Good    Psychomotor Behavior: Normal    Attitude:   Cooperative    Orientation:   All   Speech    Rate / Production: Normal     Volume:  Normal    Mood:    Anxious  Normal   Affect:    Appropriate  Worrisome    Thought Content:  Clear    Thought Form:  Coherent  Logical    Insight:    Fair      Medication Review:   No current psychiatric medications prescribed     Medication Compliance:   NA     Changes in Health Issues:   None reported     Chemical Use Review:   Substance Use: Chemical use reviewed, no active concerns identified      Tobacco Use: No change in amount of tobacco use since last session.  Action    Diagnosis:  1. Adjustment disorder with anxious mood        Collateral Reports Completed:   Not Applicable    PLAN: (Client Tasks / Therapist Tasks / Other)  Client agreed to continue to work on finalizing his divorce in an effective manner between now and follow-up session next week.        Cali Navarro, LMFT                                                         ______________________________________________________________________    Treatment Plan    Client's Name: Kendrick Sosa  YOB: 1985    Date: 6/5/19    DSM-V Diagnoses: Adjustment Disorders  309.24 (F43.22) With anxiety  Psychosocial / Contextual Factors: going through divorce from wife, history of loss/trauma, unemployed/no income    WHODAS: 15    Referral / Collaboration:  Referral to another professional/service is not indicated at this time..    Anticipated number of session or this episode of care: 11-20      MeasurableTreatment Goal(s) related to diagnosis / functional impairment(s)  Goal 1: Client will successfully process through past trauma defined as reporting 0 Subjective Units of Distress related to trauma on 0-10 scale and 7 on Validity of (positive)  Cognition scale about self on 1-7 scale   I will know I've met my goal when I feel less impacted by my past loss/trauma..       Objective #A (Client Action)    Status: New - Date: 6/5/19      Client will identify past traumatic events/memories which are causing current distress.     Intervention(s)  Therapist will take client's history and facilitate client's identification of targets for EMDR.     Objective #B  Client will complete needed assessment(s) and [facility] EM[facility] resourcing to confirm readiness for EMDR.    Status: New - Date: 6/5/19      Intervention(s)  Therapist will administer Dissociative Experiences Scale, Multidimensional Inventory of Dissociation as needed and complete [facility] EMDR resourcing with client.     Objective #C  Client will engage in installing at least 2 EMDR resources.  Status: New - Date: 6/5/19      Intervention(s)  Therapist will complete EMDR resourcing (Container, Remote Control, grounding/progressive muscle relaxation, Light Stream, Inner Advisor) with client.     Objective #D (Client Action)    Status: New - Date: 6/5/19      Client will engage in reprocessing all past traumatic event/memory targets.     Intervention(s)   Therapist will complete EMDR reprocessing with client.    Goal 2: Client will increase overall baseline calm mindset by 2 points on a 1-10 Likert scale per self-report (10 = optimal calm mindset).    I will know I've met my goal when I feel less anxious.      Objective #A (Client Action)    Status: New - Date: 6/5/19     Client will use cognitive strategies identified in therapy to challenge anxious thoughts.    Intervention(s)  Therapist will teach emotional regulation skills. CBT/REBT ABCD model.    Objective #B  Client will use at least 2 new coping skills for anxiety management in the next 12 weeks.    Status: New - Date: 6/5/19     Intervention(s)  Therapist will teach emotional regulation skills. DBT Core Mindfulness, Distress Tolerance, Emotion  Regulation, and Interpersonal Effectiveness skills.    Client has reviewed and agreed to the above plan.      Cali Navarro, LMFT  July 11, 2019

## 2019-08-02 ENCOUNTER — OFFICE VISIT (OUTPATIENT)
Dept: PSYCHOLOGY | Facility: CLINIC | Age: 34
End: 2019-08-02
Payer: COMMERCIAL

## 2019-08-02 DIAGNOSIS — F43.22 ADJUSTMENT DISORDER WITH ANXIOUS MOOD: Primary | ICD-10-CM

## 2019-08-02 PROCEDURE — 90834 PSYTX W PT 45 MINUTES: CPT | Performed by: MARRIAGE & FAMILY THERAPIST

## 2019-08-02 ASSESSMENT — ANXIETY QUESTIONNAIRES
7. FEELING AFRAID AS IF SOMETHING AWFUL MIGHT HAPPEN: NOT AT ALL
6. BECOMING EASILY ANNOYED OR IRRITABLE: NOT AT ALL
1. FEELING NERVOUS, ANXIOUS, OR ON EDGE: SEVERAL DAYS
4. TROUBLE RELAXING: NOT AT ALL
GAD7 TOTAL SCORE: 1
7. FEELING AFRAID AS IF SOMETHING AWFUL MIGHT HAPPEN: NOT AT ALL
2. NOT BEING ABLE TO STOP OR CONTROL WORRYING: NOT AT ALL
GAD7 TOTAL SCORE: 1
GAD7 TOTAL SCORE: 1
3. WORRYING TOO MUCH ABOUT DIFFERENT THINGS: NOT AT ALL
5. BEING SO RESTLESS THAT IT IS HARD TO SIT STILL: NOT AT ALL

## 2019-08-02 ASSESSMENT — PATIENT HEALTH QUESTIONNAIRE - PHQ9
SUM OF ALL RESPONSES TO PHQ QUESTIONS 1-9: 1
SUM OF ALL RESPONSES TO PHQ QUESTIONS 1-9: 1
10. IF YOU CHECKED OFF ANY PROBLEMS, HOW DIFFICULT HAVE THESE PROBLEMS MADE IT FOR YOU TO DO YOUR WORK, TAKE CARE OF THINGS AT HOME, OR GET ALONG WITH OTHER PEOPLE: NOT DIFFICULT AT ALL

## 2019-08-03 ASSESSMENT — ANXIETY QUESTIONNAIRES: GAD7 TOTAL SCORE: 1

## 2019-08-03 ASSESSMENT — PATIENT HEALTH QUESTIONNAIRE - PHQ9: SUM OF ALL RESPONSES TO PHQ QUESTIONS 1-9: 1

## 2019-08-05 NOTE — PROGRESS NOTES
Progress Note    Client Name: Kendrick Sosa  Date: 8/2/19         Service Type: Individual  Video Visit: No     Session Start Time: 1:30  Session End Time: 2:22     Session Length: 38-52    Session #: 7    Attendees: Client attended alone     Treatment Plan Last Reviewed: 6/5/19, next due 9/5/19.  PHQ-9 / MILAD-7 : completed.    DATA  Interactive Complexity: No  Crisis: No       Progress Since Last Session (Related to Symptoms / Goals / Homework):   Symptoms: Worsening client reported increased PHQ-9 and MILAD-7 scores.    Homework: Partially completed      Episode of Care Goals: Minimal progress - ACTION (Actively working towards change); Intervened by reinforcing change plan / affirming steps taken        Current / Ongoing Stressors and Concerns:   Client reported that he has continued working with his wife's  to finalize his divorce.  Client reported that he now has contact with his children, which is also stressful to him as it requires contact with his gnof-iy-ty-ex-wife.  Client reported continuing to be tired due to his busy work schedule, but that work is going well.     Treatment Objective(s) Addressed in This Session:   identify some strategies to more effectively address stressors     Intervention:   CBT: reviewed with client focusing on positives (contact with his children) vs. negatives (contact with wife).  DBT: reviewed with client engaging in enjoyable activities (e.g. building models) to improve his mood.        ASSESSMENT: Current Emotional / Mental Status (status of significant symptoms):   Risk status (Self / Other harm or suicidal ideation)   Client denies current fears or concerns for personal safety.   Client denies current or recent suicidal ideation or behaviors.   Client denies current or recent homicidal ideation or behaviors.   Client denies current or recent self injurious behavior or ideation.   Client denies other safety concerns.   Client  Client reports there has been no change in risk factors since their last session.     Client Patient reports there has been no change in protective factors since their last session.     A safety and risk management plan has not been developed at this time, however client was given the after-hours number / 911 should there be a change in any of these risk factors.     Appearance:   Appropriate    Eye Contact:   Good    Psychomotor Behavior: Normal    Attitude:   Cooperative    Orientation:   All   Speech    Rate / Production: Normal     Volume:  Normal    Mood:    Anxious  Normal   Affect:    Appropriate  Worrisome    Thought Content:  Clear    Thought Form:  Coherent  Logical    Insight:    Fair      Medication Review:   No current psychiatric medications prescribed     Medication Compliance:   NA     Changes in Health Issues:   None reported     Chemical Use Review:   Substance Use: Chemical use reviewed, no active concerns identified      Tobacco Use: No change in amount of tobacco use since last session.  Action    Diagnosis:  1. Adjustment disorder with anxious mood        Collateral Reports Completed:   Not Applicable    PLAN: (Client Tasks / Therapist Tasks / Other)  Client agreed to work on reframing his focus from negatives to positives and to engage in enjoyable activities to improve mood between now and follow-up session next week.        Cali Navarro LMFT                                                         ______________________________________________________________________    Treatment Plan    Client's Name: Kendrick Sosa  YOB: 1985    Date: 6/5/19    DSM-V Diagnoses: Adjustment Disorders  309.24 (F43.22) With anxiety  Psychosocial / Contextual Factors: going through divorce from wife, history of loss/trauma, unemployed/no income    WHODAS: 15    Referral / Collaboration:  Referral to another professional/service is not indicated at this time..    Anticipated number of  session or this episode of care: 11-20      MeasurableTreatment Goal(s) related to diagnosis / functional impairment(s)  Goal 1: Client will successfully process through past trauma defined as reporting 0 Subjective Units of Distress related to trauma on 0-10 scale and 7 on Validity of (positive) Cognition scale about self on 1-7 scale   I will know I've met my goal when I feel less impacted by my past loss/trauma..       Objective #A (Client Action)    Status: New - Date: 6/5/19      Client will identify past traumatic events/memories which are causing current distress.     Intervention(s)  Therapist will take client's history and facilitate client's identification of targets for EMDR.     Objective #B  Client will complete needed assessment(s) and [facility] resourcing to confirm readiness for EMDR.    Status: New - Date: 6/5/19      Intervention(s)  Therapist will administer Dissociative Experiences Scale, Multidimensional Inventory of Dissociation as needed and complete [facility] resourcing with client.     Objective #C  Client will engage in installing at least 2 EMDR resources.  Status: New - Date: 6/5/19      Intervention(s)  Therapist will complete EMDR resourcing (Container, Remote Control, grounding/progressive muscle relaxation, Light Stream, Inner Advisor) with client.     Objective #D (Client Action)    Status: New - Date: 6/5/19      Client will engage in reprocessing all past traumatic event/memory targets.     Intervention(s)   Therapist will complete EMDR reprocessing with client.    Goal 2: Client will increase overall baseline calm mindset by 2 points on a 1-10 Likert scale per self-report (10 = optimal calm mindset).    I will know I've met my goal when I feel less anxious.      Objective #A (Client Action)    Status: New - Date: 6/5/19     Client will use cognitive strategies identified in therapy to challenge anxious thoughts.    Intervention(s)  Therapist will teach emotional  regulation skills. CBT/REBT ABCD model.    Objective #B  Client will use at least 2 new coping skills for anxiety management in the next 12 weeks.    Status: New - Date: 6/5/19     Intervention(s)  Therapist will teach emotional regulation skills. DBT Core Mindfulness, Distress Tolerance, Emotion Regulation, and Interpersonal Effectiveness skills.    Client has reviewed and agreed to the above plan.      YAN Hooker  August 2, 2019

## 2019-08-08 ENCOUNTER — OFFICE VISIT (OUTPATIENT)
Dept: PSYCHOLOGY | Facility: CLINIC | Age: 34
End: 2019-08-08
Payer: COMMERCIAL

## 2019-08-08 DIAGNOSIS — F43.22 ADJUSTMENT DISORDER WITH ANXIOUS MOOD: Primary | ICD-10-CM

## 2019-08-08 PROCEDURE — 90834 PSYTX W PT 45 MINUTES: CPT | Performed by: MARRIAGE & FAMILY THERAPIST

## 2019-08-08 NOTE — PROGRESS NOTES
Progress Note    Client Name: Kendrick Sosa  Date: 8/8/19         Service Type: Individual  Video Visit: No     Session Start Time: 1:00  Session End Time: 1:52     Session Length: 38-52    Session #: 8    Attendees: Client attended alone     Treatment Plan Last Reviewed: 6/5/19, next due 9/5/19.  PHQ-9 / MILAD-7 : completed.    DATA  Interactive Complexity: No  Crisis: No       Progress Since Last Session (Related to Symptoms / Goals / Homework):   Symptoms: Improving client reported decreased PHQ-9 score.    Homework: Partially completed       Episode of Care Goals: Satisfactory progress - ACTION (Actively working towards change); Intervened by reinforcing change plan / affirming steps taken        Current / Ongoing Stressors and Concerns:   Client reported that he is trying to get a vehicle for his transportation to work with the upcoming seasonal change, but that he doesn't have enough saved up yet and his sister--who is offering to see him a car--isn't responding to him, so he feels anxious about this.  Client reported that he continues to have contact with his children, which is still stressful to him when he has contact with his bvpu-wa-yh-ex-wife, at which point he ends the phone call.  Client reported continued low energy due to his busy work schedule, but that work continues to go well.      Treatment Objective(s) Addressed in This Session:   identify some strategies to more effectively address stressors     Intervention:   Solution Focused: reviewed with client budgeting for his car.        ASSESSMENT: Current Emotional / Mental Status (status of significant symptoms):   Risk status (Self / Other harm or suicidal ideation)   Client denies current fears or concerns for personal safety.   Client denies current or recent suicidal ideation or behaviors.   Client denies current or recent homicidal ideation or behaviors.   Client denies current or recent self injurious  behavior or ideation.   Client denies other safety concerns.   Client Client reports there has been no change in risk factors since their last session.     Client Patient reports there has been no change in protective factors since their last session.     A safety and risk management plan has not been developed at this time, however client was given the after-hours number / 911 should there be a change in any of these risk factors.     Appearance:   Appropriate    Eye Contact:   Good    Psychomotor Behavior: Normal    Attitude:   Cooperative    Orientation:   All   Speech    Rate / Production: Normal     Volume:  Normal    Mood:    Anxious  Normal   Affect:    Appropriate  Worrisome    Thought Content:  Clear    Thought Form:  Coherent  Logical    Insight:    Good      Medication Review:   No current psychiatric medications prescribed     Medication Compliance:   NA     Changes in Health Issues:   None reported     Chemical Use Review:   Substance Use: Chemical use reviewed, no active concerns identified      Tobacco Use: No change in amount of tobacco use since last session.  Action    Diagnosis:  1. Adjustment disorder with anxious mood        Collateral Reports Completed:   Not Applicable    PLAN: (Client Tasks / Therapist Tasks / Other)  Client agreed to work on budgeting for his car between now and follow-up session in three weeks.        Cali Navarro LMFT                                                         ______________________________________________________________________    Treatment Plan    Client's Name: Kendrick Sosa  YOB: 1985    Date: 6/5/19    DSM-V Diagnoses: Adjustment Disorders  309.24 (F43.22) With anxiety  Psychosocial / Contextual Factors: going through divorce from wife, history of loss/trauma, unemployed/no income    WHODAS: 15    Referral / Collaboration:  Referral to another professional/service is not indicated at this time..    Anticipated number of session  or this episode of care: 11-20      MeasurableTreatment Goal(s) related to diagnosis / functional impairment(s)  Goal 1: Client will successfully process through past trauma defined as reporting 0 Subjective Units of Distress related to trauma on 0-10 scale and 7 on Validity of (positive) Cognition scale about self on 1-7 scale   I will know I've met my goal when I feel less impacted by my past loss/trauma..       Objective #A (Client Action)    Status: New - Date: 6/5/19      Client will identify past traumatic events/memories which are causing current distress.     Intervention(s)  Therapist will take client's history and facilitate client's identification of targets for EMDR.     Objective #B  Client will complete needed assessment(s) and [facility] resourcing to confirm readiness for EMDR.    Status: New - Date: 6/5/19      Intervention(s)  Therapist will administer Dissociative Experiences Scale, Multidimensional Inventory of Dissociation as needed and complete [facility] resourcing with client.     Objective #C  Client will engage in installing at least 2 EMDR resources.  Status: New - Date: 6/5/19      Intervention(s)  Therapist will complete EMDR resourcing (Container, Remote Control, grounding/progressive muscle relaxation, Light Stream, Inner Advisor) with client.     Objective #D (Client Action)    Status: New - Date: 6/5/19      Client will engage in reprocessing all past traumatic event/memory targets.     Intervention(s)   Therapist will complete EMDR reprocessing with client.    Goal 2: Client will increase overall baseline calm mindset by 2 points on a 1-10 Likert scale per self-report (10 = optimal calm mindset).    I will know I've met my goal when I feel less anxious.      Objective #A (Client Action)    Status: New - Date: 6/5/19     Client will use cognitive strategies identified in therapy to challenge anxious thoughts.    Intervention(s)  Therapist will teach emotional regulation  skills. CBT/REBT ABCD model.    Objective #B  Client will use at least 2 new coping skills for anxiety management in the next 12 weeks.    Status: New - Date: 6/5/19     Intervention(s)  Therapist will teach emotional regulation skills. DBT Core Mindfulness, Distress Tolerance, Emotion Regulation, and Interpersonal Effectiveness skills.    Client has reviewed and agreed to the above plan.      YAN Hooker  August 8, 2019

## 2019-08-09 ASSESSMENT — PATIENT HEALTH QUESTIONNAIRE - PHQ9: SUM OF ALL RESPONSES TO PHQ QUESTIONS 1-9: 0

## 2019-08-18 ENCOUNTER — TRANSFERRED RECORDS (OUTPATIENT)
Dept: HEALTH INFORMATION MANAGEMENT | Facility: CLINIC | Age: 34
End: 2019-08-18

## 2019-08-23 NOTE — PROGRESS NOTES
Subjective     Kendrick Sosa is a 33 year old male who presents to clinic today for the following health issues:    HPI   Chief Complaint   Patient presents with     Referral     Urology for vasectomy and would like more information about procedure     Patient would like to get a vasectomy done and wants to discuss procedure and obtain a urology referral.    BP Readings from Last 3 Encounters:   08/27/19 106/72   04/01/19 118/78    Wt Readings from Last 3 Encounters:   08/27/19 99.1 kg (218 lb 6.4 oz)   04/01/19 98.5 kg (217 lb 3.2 oz)                    Reviewed and updated as needed this visit by Provider  Tobacco  Allergies  Meds  Problems  Med Hx  Surg Hx  Fam Hx         Review of Systems   ROS COMP: Constitutional, HEENT, cardiovascular, pulmonary, gi and gu systems are negative, except as otherwise noted.      Objective    /72   Pulse 78   Temp 97.3  F (36.3  C) (Oral)   Resp 18   Wt 99.1 kg (218 lb 6.4 oz)   SpO2 100%   BMI 29.04 kg/m    Body mass index is 29.04 kg/m .  Physical Exam   GENERAL: healthy, alert and no distress  EYES: Eyes grossly normal to inspection, PERRL and conjunctivae and sclerae normal  NECK: no adenopathy, no asymmetry, masses, or scars and thyroid normal to palpation  SKIN: no suspicious lesions or rashes  PSYCH: mentation appears normal, affect normal/bright          Assessment & Plan       ICD-10-CM    1. Encounter for other general counseling or advice on contraception Z30.09 UROLOGY ADULT REFERRAL     Procedure discussed, referral placed for patient to see urology to discuss further.       Follow up if symptoms worsen or fail to improve.   Darrion Godinez MD  [facility]

## 2019-08-27 ENCOUNTER — OFFICE VISIT (OUTPATIENT)
Dept: FAMILY MEDICINE | Facility: CLINIC | Age: 34
End: 2019-08-27
Payer: COMMERCIAL

## 2019-08-27 VITALS
DIASTOLIC BLOOD PRESSURE: 72 MMHG | RESPIRATION RATE: 18 BRPM | TEMPERATURE: 97.3 F | WEIGHT: 218.4 LBS | BODY MASS INDEX: 29.04 KG/M2 | HEART RATE: 78 BPM | SYSTOLIC BLOOD PRESSURE: 106 MMHG | OXYGEN SATURATION: 100 %

## 2019-08-27 DIAGNOSIS — Z30.09 ENCOUNTER FOR OTHER GENERAL COUNSELING OR ADVICE ON CONTRACEPTION: Primary | ICD-10-CM

## 2019-08-27 PROCEDURE — 99213 OFFICE O/P EST LOW 20 MIN: CPT | Performed by: FAMILY MEDICINE

## 2019-08-27 RX ORDER — CLINDAMYCIN HCL 150 MG
CAPSULE ORAL
COMMUNITY
Start: 2019-08-18 | End: 2019-08-28

## 2019-08-27 NOTE — PATIENT INSTRUCTIONS
Patient Education     Having a Vasectomy: Before, During, and After the Procedure     The cut ends of the vas may be tied, closed with a clip, or sealed by heat (cauterized).   Vasectomy is an outpatient procedure. This means you can go home the same day. It can be done in a doctor s office, clinic, or hospital. Your doctor will talk with you about how to get ready for surgery. He or she will also discuss the possible risks and complications with you. After the procedure, follow your doctor s advice for recovery.  Getting ready for surgery  Your doctor will talk with you about getting ready for surgery. You may be asked to do the following:    Sign a consent form. This must be done at least a few days before surgery. It gives your doctor permission to do the procedure. It also states that a vasectomy is not guaranteed to make you sterile.    Don t take aspirin, ibuprofen, or naproxen for 2 weeks before surgery. These medicines can cause bleeding after the procedure. Also, tell your doctor if you take any medicines, supplements, or herbal remedies.    Tell your doctor if you ve had any scrotal surgery in the past.    Arrange for an adult family member or friend to give you a ride home after surgery.    Shower and clean your scrotum the day of surgery. Your doctor may also ask you to shave your scrotum.    Bring a jock strap (athletic supporter) or pair of snug cotton briefs to the doctor s office or hospital.    Eat no more than a light snack before surgery.  During surgery  The entire procedure usually lasts less than 30 minutes.    You ll be asked to undress and lie on a table.    You may be given medicine to help you relax. To prevent pain during surgery, you ll be given an injection of pain medicine in your scrotum or lower groin.    Once the area is numb, the doctor makes one or two small incisions in the scrotum. This may be done with a scalpel or with a pointed clamp (no-scalpel method).    The vas deferens  are lifted through the incision and cut. The provider seals off the ends of the vas deferens using one of several methods.    If needed, the incision is closed with stitches.    You can rest for a while until you re ready to go home.  Recovering at home  For about a week, your scrotum may look bruised and slightly swollen. You may also have a small amount of bloody discharge from the incision. This is normal.  To help make your recovery more comfortable, follow the tips below.    Stay off your feet as much as possible for the first 2 days. Try to lie flat on a bed or sofa.    Wear an athletic supporter or snug cotton briefs for support.    Reduce swelling by using an ice pack or bag of frozen peas wrapped in a thin towel. Put the ice pack or towel on your scrotum.    Take medicines with acetaminophen to relieve any discomfort. Don t use aspirin, ibuprofen, or naproxen.    Wait 48 hours before bathing.    Avoid heavy lifting or exercise for 7 days.    Ask your doctor how long to wait before having sex again. Remember: You must use another form of birth control until you re completely sterile.  When to seek medical care  Call your doctor if you notice any of the following after surgery:    Increasing pain or swelling in your scrotum    A large black-and-blue area, or a growing lump    Fever or chills    Increasing redness or drainage of the incision    Trouble urinating   Sex after vasectomy  Vasectomy doesn t change your sexual function. So when you start having sex again, it should feel the same as before. A vasectomy also shouldn t affect your relationship with your partner. It s important to remember, though, that you won t become sterile right away. It will take time before you can have sex without the need for birth control.    Until you re sterile: After a vasectomy, some active sperm still remain in your semen. It will take time and many ejaculations before the sperm are completely gone. During this period,  you must use another birth control method to prevent pregnancy. To make sure no sperm are left in your semen, you ll need to have one or more semen exams. You usually collect a semen sample at home and bring it to a lab. The sample is then checked under a microscope. You re sterile only when these samples show no evidence of sperm. Ask your doctor whether additional follow-up is needed.    After you re sterile: After your doctor tells you you re sterile, you no longer need to use any form of birth control. You re free to have sex without the fear of unwanted pregnancy. But a vasectomy does not protect you from sexually transmitted diseases (STDs). If you have more than one sex partner, be sure to practice safer sex by using condoms.  Date Last Reviewed: 1/1/2017 2000-2018 [employer]. [address] [place], [place] [id number]. All rights reserved. This information is not intended as a substitute for professional medical care. Always follow your healthcare professional's instructions.           Patient Education     Understanding Vasectomy  Vasectomy is a simple, safe procedure that makes a man sterile (unable to father a child). It is the most effective birth control method for men.  Your reproductive system  For pregnancy to occur, a man s sperm (male reproductive cells) must join with a woman s egg. To understand how a vasectomy works, you need to know how sperm are produced, stored, and released by the body:    The urethra is the tube in the center of the penis. It transports both urine and semen. When you have an orgasm, semen is ejaculated out of the urethra.    The seminal vesicles and the prostate gland secrete fluids called semen. This sticky, white fluid helps nourish sperm and carry them along.    The epididymis is a coiled tube that holds the sperm while they mature.    The scrotum is a pouch of skin that contains the testes.    The testes are glands that produce sperm and male  hormones.    The vas deferens are tubes that carry the sperm from the epididymis to the penis.    Sperm (shown magnified) carry genetic material.     How a vasectomy works  During the procedure, the 2 vas deferens are cut and sealed off. This prevents sperm from traveling from the testes to the penis. It is the only change in your reproductive system. The testes still produce sperm. But since the sperm have nowhere to go, they die and are absorbed by your body. Only a very small amount of semen is made up of sperm. So after a vasectomy, your semen won t look or feel any different.  Keep in mind  After a vasectomy, some active sperm still remain in the reproductive system. It will take about 3 months and numerous ejaculations before the semen is completely free of sperm. Until then, you ll need to use another form of birth control.   Date Last Reviewed: 1/1/2017 2000-2018 The [employer]. [address] [place] [address]. All rights reserved. This information is not intended as a substitute for professional medical care. Always follow your healthcare professional's instructions.           Patient Education     Vasectomy: Risks and Complications  A vasectomy is an outpatient procedure. This means you ll go home the same day. It s done in a doctor s office, clinic, or hospital. Before your procedure, you ll be asked to read and sign a consent form. This form states you re aware of the possible risks and complications. It also says that you understand that the procedure, though most often successful, can t promise to make you sterile. Be sure you have all your questions answered before you sign this form. Below is a list of risks and possible complications of the procedure.  Risks and possible complications of vasectomy  Vasectomy is safe. But it does have risks. They include the following:    Bleeding or infection    Sperm granuloma. This is a small, harmless lump. It may form where the vas  deferens is sealed off.    Sperm buildup (congestion). This may cause soreness in the testes. Anti-inflammatory medicine can provide relief.    Epididymitis. This is inflammation that may cause scrotal aching. It often goes away without treatment. Anti-inflammatory medicine can provide relief.    Reconnection of the vas deferens. This can occur in rare cases. It makes you fertile again. This may result in an unplanned pregnancy.    Sperm antibodies. Developing antibodies is a common response of your body to the absorbed sperm. The antibodies can make you sterile. This is true even if you later try to reverse your vasectomy.    Long-term testicular discomfort. This may occur after surgery. But it s very rare.   Date Last Reviewed: 1/1/2017 2000-2018 The [employer]. [address] [place] [address], [place] [address]. All rights reserved. This information is not intended as a substitute for professional medical care. Always follow your healthcare professional's instructions.           Patient Education     Vasectomy Reversal    Each of your testicles makes sperm (male reproductive cells). Sperm travel from the testicles to the penis through one of 2 tubes called the vas deferens. On the way, sperm mix with other fluids to form semen, which leaves the body during ejaculation. During a vasectomy, each vas deferens is cut, preventing sperm from leaving the body. This makes you sterile (unable to make a woman pregnant). A vasectomy can sometimes be reversed, restoring the flow of sperm out of the body.  How the procedure works  During a vasectomy reversal, the 2 cut ends of the vas deferens are stitched back together. With the sperm pathways restored, sperm can once again travel through the vas deferens and leave the body during ejaculation. You may then be able to father a child.  Preparing for the procedure  You will be given instructions to prepare for the vasectomy reversal. Tell your healthcare provider about any  medicines you take, including aspirin. You may be asked to stop taking some or all of these. On the day of your procedure, bring clean cotton briefs or an athletic support with you.  During the procedure  You ll receive medicine to keep you pain free. You may be awake and relaxed during the procedure. Or, you may be completely asleep. Once the medicine takes effect:    An incision is made in your scrotum.    The cut ends of each vas deferens are lifted out and examined. A section of each cut end may be removed.    The end closer to the testicles is cut until fluid flows freely. This fluid may be looked at under a microscope to see if sperm are present.    The 2 cut ends are stitched together. If needed, the vas may be attached directly to the epididymis (tissue behind the testicle).    When both of the vas deferens are reconnected, the incisions in the scrotum are sutured closed.  After the procedure  You may need to stay in the hospital for several hours. When it s time to go home, have an adult family member or friend drive you. Once you re home:    Take medicine as directed to relieve any pain.    To lessen the chance of swelling, stay off your feet as much as you can for the first day.    Place an ice pack or bag of frozen peas (wrapped in a thin towel) on your scrotum for short amounts of time. This helps reduce swelling.    Wear an athletic support or snug cotton briefs for extra support.    Follow your doctor s instructions for showering and bathing.    Ask your doctor when it s OK to have sex.    Avoid heavy lifting or exercise for at least 2 weeks. Ask your doctor when you can return to work.  Possible risks and complications    Risks associated with anesthesia    Infection (symptoms include fever, chills, drainage from the incision site, and pain)    Internal bleeding of the scrotum (symptoms include increasing pain, excessive swelling, a large black-and-blue area, or a growing lump)    Failure of the  procedure to restore fertility   Date Last Reviewed: 1/1/2017 2000-2018 [employer]. [address] [place] [address], [place] [address]. All rights reserved. This information is not intended as a substitute for professional medical care. Always follow your healthcare professional's instructions.

## 2019-08-28 ENCOUNTER — OFFICE VISIT (OUTPATIENT)
Dept: PSYCHOLOGY | Facility: CLINIC | Age: 34
End: 2019-08-28
Payer: COMMERCIAL

## 2019-08-28 DIAGNOSIS — F43.22 ADJUSTMENT DISORDER WITH ANXIOUS MOOD: Primary | ICD-10-CM

## 2019-08-28 PROCEDURE — 90834 PSYTX W PT 45 MINUTES: CPT | Performed by: MARRIAGE & FAMILY THERAPIST

## 2019-08-28 ASSESSMENT — ANXIETY QUESTIONNAIRES
7. FEELING AFRAID AS IF SOMETHING AWFUL MIGHT HAPPEN: SEVERAL DAYS
2. NOT BEING ABLE TO STOP OR CONTROL WORRYING: SEVERAL DAYS
1. FEELING NERVOUS, ANXIOUS, OR ON EDGE: SEVERAL DAYS
GAD7 TOTAL SCORE: 6
5. BEING SO RESTLESS THAT IT IS HARD TO SIT STILL: SEVERAL DAYS
7. FEELING AFRAID AS IF SOMETHING AWFUL MIGHT HAPPEN: SEVERAL DAYS
3. WORRYING TOO MUCH ABOUT DIFFERENT THINGS: SEVERAL DAYS
GAD7 TOTAL SCORE: 6
GAD7 TOTAL SCORE: 6
4. TROUBLE RELAXING: SEVERAL DAYS
6. BECOMING EASILY ANNOYED OR IRRITABLE: NOT AT ALL

## 2019-08-28 ASSESSMENT — PATIENT HEALTH QUESTIONNAIRE - PHQ9
SUM OF ALL RESPONSES TO PHQ QUESTIONS 1-9: 1
SUM OF ALL RESPONSES TO PHQ QUESTIONS 1-9: 1
10. IF YOU CHECKED OFF ANY PROBLEMS, HOW DIFFICULT HAVE THESE PROBLEMS MADE IT FOR YOU TO DO YOUR WORK, TAKE CARE OF THINGS AT HOME, OR GET ALONG WITH OTHER PEOPLE: SOMEWHAT DIFFICULT

## 2019-08-28 NOTE — PROGRESS NOTES
Progress Note    Client Name: Kendrick Sosa  Date: 8/28/19         Service Type: Individual  Video Visit: No     Session Start Time: 1:00  Session End Time: 1:52     Session Length: 38-52    Session #: 9    Attendees: Client attended alone     Treatment Plan Last Reviewed: 6/5/19, next due 9/5/19.  PHQ-9 / MILAD-7 : completed.    DATA  Interactive Complexity: No  Crisis: No       Progress Since Last Session (Related to Symptoms / Goals / Homework):   Symptoms: Worsening client reported increased PHQ-9 and MILAD-7 scores.    Homework: Partially completed      Episode of Care Goals: Satisfactory progress - ACTION (Actively working towards change); Intervened by reinforcing change plan / affirming steps taken        Current / Ongoing Stressors and Concerns:   Client reported that he is experiencing financial-related stress.  Client reported that he will have to start paying child support soon, and will lose his medical insurance as well as a result of the [id number] divorce.  Client reported that he continues to have anxiety when he has contact with his children as he has to go through contact with his [id number]-ex-wife.      Treatment Objective(s) Addressed in This Session:   identify some strategies to more effectively address stressors     Intervention:   DBT: reviewed with client distraction/increasing positive emotions/experiences skills/strategies for coping with anxiety.        ASSESSMENT: Current Emotional / Mental Status (status of significant symptoms):   Risk status (Self / Other harm or suicidal ideation)   Client denies current fears or concerns for personal safety.   Client denies current or recent suicidal ideation or behaviors.   Client denies current or recent homicidal ideation or behaviors.   Client denies current or recent self injurious behavior or ideation.   Client denies other safety concerns.   Client Client reports there has been no change in  risk factors since their last session.     Client Patient reports there has been no change in protective factors since their last session.     A safety and risk management plan has not been developed at this time, however client was given the after-hours number / 911 should there be a change in any of these risk factors.     Appearance:   Appropriate    Eye Contact:   Good    Psychomotor Behavior: Normal    Attitude:   Cooperative    Orientation:   All   Speech    Rate / Production: Normal     Volume:  Normal    Mood:    Anxious  Normal   Affect:    Appropriate  Worrisome    Thought Content:  Clear    Thought Form:  Coherent  Logical    Insight:    Good      Medication Review:   No current psychiatric medications prescribed     Medication Compliance:   NA     Changes in Health Issues:   None reported     Chemical Use Review:   Substance Use: Chemical use reviewed, no active concerns identified      Tobacco Use: No change in amount of tobacco use since last session.  Action    Diagnosis:  1. Adjustment disorder with anxious mood        Collateral Reports Completed:   Not Applicable    PLAN: (Client Tasks / Therapist Tasks / Other)  Client agreed to work on using distraction/Increase Positive Emotions/Experiences skills/strategies list as needed for his anxiety between now and follow-up session next week.        Cali Navarro, [facility]                                                         ______________________________________________________________________    Treatment Plan    Client's Name: Kendrick Sosa  YOB: 1985    Date: 6/5/19    DSM-V Diagnoses: Adjustment Disorders  309.24 (F43.22) With anxiety  Psychosocial / Contextual Factors: going through divorce from wife, history of loss/trauma, unemployed/no income    WHODAS: 15    Referral / Collaboration:  Referral to another professional/service is not indicated at this time..    Anticipated number of session or this episode of care:  11-20      MeasurableTreatment Goal(s) related to diagnosis / functional impairment(s)  Goal 1: Client will successfully process through past trauma defined as reporting 0 Subjective Units of Distress related to trauma on 0-10 scale and 7 on Validity of (positive) Cognition scale about self on 1-7 scale   I will know I've met my goal when I feel less impacted by my past loss/trauma..       Objective #A (Client Action)    Status: New - Date: 6/5/19      Client will identify past traumatic events/memories which are causing current distress.     Intervention(s)  Therapist will take client's history and facilitate client's identification of targets for EMDR.     Objective #B  Client will complete needed assessment(s) and [facility] EM[facility] resourcing to confirm readiness for EMDR.    Status: New - Date: 6/5/19      Intervention(s)  Therapist will administer Dissociative Experiences Scale, Multidimensional Inventory of Dissociation as needed and complete [facility] resourcing with client.     Objective #C  Client will engage in installing at least 2 EMDR resources.  Status: New - Date: 6/5/19      Intervention(s)  Therapist will complete EMDR resourcing (Container, Remote Control, grounding/progressive muscle relaxation, Light Stream, Inner Advisor) with client.     Objective #D (Client Action)    Status: New - Date: 6/5/19      Client will engage in reprocessing all past traumatic event/memory targets.     Intervention(s)   Therapist will complete EMDR reprocessing with client.    Goal 2: Client will increase overall baseline calm mindset by 2 points on a 1-10 Likert scale per self-report (10 = optimal calm mindset).    I will know I've met my goal when I feel less anxious.      Objective #A (Client Action)    Status: New - Date: 6/5/19     Client will use cognitive strategies identified in therapy to challenge anxious thoughts.    Intervention(s)  Therapist will teach emotional regulation skills. CBT/REBT ABCD  model.    Objective #B  Client will use at least 2 new coping skills for anxiety management in the next 12 weeks.    Status: New - Date: 6/5/19     Intervention(s)  Therapist will teach emotional regulation skills. DBT Core Mindfulness, Distress Tolerance, Emotion Regulation, and Interpersonal Effectiveness skills.    Client has reviewed and agreed to the above plan.      YAN Hooker  August 28, 2019

## 2019-08-29 ASSESSMENT — ANXIETY QUESTIONNAIRES: GAD7 TOTAL SCORE: 6

## 2019-08-29 ASSESSMENT — PATIENT HEALTH QUESTIONNAIRE - PHQ9: SUM OF ALL RESPONSES TO PHQ QUESTIONS 1-9: 1

## 2019-09-03 ENCOUNTER — OFFICE VISIT (OUTPATIENT)
Dept: PSYCHOLOGY | Facility: CLINIC | Age: 34
End: 2019-09-03
Payer: COMMERCIAL

## 2019-09-03 DIAGNOSIS — F43.22 ADJUSTMENT DISORDER WITH ANXIOUS MOOD: Primary | ICD-10-CM

## 2019-09-03 PROCEDURE — 90834 PSYTX W PT 45 MINUTES: CPT | Performed by: MARRIAGE & FAMILY THERAPIST

## 2019-09-03 ASSESSMENT — PATIENT HEALTH QUESTIONNAIRE - PHQ9
SUM OF ALL RESPONSES TO PHQ QUESTIONS 1-9: 0
SUM OF ALL RESPONSES TO PHQ QUESTIONS 1-9: 0
10. IF YOU CHECKED OFF ANY PROBLEMS, HOW DIFFICULT HAVE THESE PROBLEMS MADE IT FOR YOU TO DO YOUR WORK, TAKE CARE OF THINGS AT HOME, OR GET ALONG WITH OTHER PEOPLE: NOT DIFFICULT AT ALL

## 2019-09-03 NOTE — PROGRESS NOTES
"                                           Progress Note    Client Name: Kendrick Sosa  Date: 9/3/19         Service Type: Individual  Video Visit: No     Session Start Time: 2:00  Session End Time: 2:50     Session Length: 38-52    Session #: 10    Attendees: Client attended alone     Treatment Plan Last Reviewed: 6/5/19, next due 9/5/19.  PHQ-9 / MIALD-7 : completed.    DATA  Interactive Complexity: No  Crisis: No       Progress Since Last Session (Related to Symptoms / Goals / Homework):   Symptoms: Improving client reported decreased PHQ-9 score.    Homework: Achieved / completed to satisfaction      Episode of Care Goals: Satisfactory progress - ACTION (Actively working towards change); Intervened by reinforcing change plan / affirming steps taken        Current / Ongoing Stressors and Concerns:   Client reported that he continues to have minimal contact with his children along with contact with his ozzj-qa-ki-ex-wife which \"spoils it.\"  Client reported that divorce just needs to be signed by , and that he must file bankruptcy due to having just discovered unpaid debts in his name.  Client reported continuing to be frustrated with the amount of control that his eglb-ce-gp-ex-wife has over him.     Treatment Objective(s) Addressed in This Session:   use cognitive strategies identified in therapy to challenge anxious thoughts     Intervention:   CBT: reviewed with client focusing on doing what he can, letting go of things outside of his control.        ASSESSMENT: Current Emotional / Mental Status (status of significant symptoms):   Risk status (Self / Other harm or suicidal ideation)   Client denies current fears or concerns for personal safety.   Client denies current or recent suicidal ideation or behaviors.   Client denies current or recent homicidal ideation or behaviors.   Client denies current or recent self injurious behavior or ideation.   Client denies other safety concerns.   Client Client reports " there has been no change in risk factors since their last session.     Client Patient reports there has been no change in protective factors since their last session.     A safety and risk management plan has not been developed at this time, however client was given the after-hours number / 911 should there be a change in any of these risk factors.     Appearance:   Appropriate    Eye Contact:   Good    Psychomotor Behavior: Normal    Attitude:   Cooperative    Orientation:   All   Speech    Rate / Production: Normal     Volume:  Normal    Mood:    Anxious  Normal   Affect:    Appropriate  Worrisome    Thought Content:  Clear    Thought Form:  Coherent  Logical    Insight:    Good      Medication Review:   No current psychiatric medications prescribed     Medication Compliance:   NA     Changes in Health Issues:   None reported     Chemical Use Review:   Substance Use: Chemical use reviewed, no active concerns identified      Tobacco Use: No change in amount of tobacco use since last session.  Action    Diagnosis:  1. Adjustment disorder with anxious mood        Collateral Reports Completed:   Not Applicable    PLAN: (Client Tasks / Therapist Tasks / Other)  Client agreed to work on focusing on doing what he can and letting go of things outside of his control between now and follow-up session next week.        Cali Navarro, [facility]                                                         ______________________________________________________________________    Treatment Plan    Client's Name: Kendrick Sosa  YOB: 1985    Date: 6/5/19    DSM-V Diagnoses: Adjustment Disorders  309.24 (F43.22) With anxiety  Psychosocial / Contextual Factors: going through divorce from wife, history of loss/trauma, unemployed/no income    WHODAS: 15    Referral / Collaboration:  Referral to another professional/service is not indicated at this time..    Anticipated number of session or this episode of care:  11-20      MeasurableTreatment Goal(s) related to diagnosis / functional impairment(s)  Goal 1: Client will successfully process through past trauma defined as reporting 0 Subjective Units of Distress related to trauma on 0-10 scale and 7 on Validity of (positive) Cognition scale about self on 1-7 scale   I will know I've met my goal when I feel less impacted by my past loss/trauma..       Objective #A (Client Action)    Status: New - Date: 6/5/19      Client will identify past traumatic events/memories which are causing current distress.     Intervention(s)  Therapist will take client's history and facilitate client's identification of targets for EMDR.     Objective #B  Client will complete needed assessment(s) and [facility] resourcing to confirm readiness for EMDR.    Status: New - Date: 6/5/19      Intervention(s)  Therapist will administer Dissociative Experiences Scale, Multidimensional Inventory of Dissociation as needed and complete [facility] resourcing with client.     Objective #C  Client will engage in installing at least 2 EMDR resources.  Status: New - Date: 6/5/19      Intervention(s)  Therapist will complete EMDR resourcing (Container, Remote Control, grounding/progressive muscle relaxation, Light Stream, Inner Advisor) with client.     Objective #D (Client Action)    Status: New - Date: 6/5/19      Client will engage in reprocessing all past traumatic event/memory targets.     Intervention(s)   Therapist will complete EMDR reprocessing with client.    Goal 2: Client will increase overall baseline calm mindset by 2 points on a 1-10 Likert scale per self-report (10 = optimal calm mindset).    I will know I've met my goal when I feel less anxious.      Objective #A (Client Action)    Status: New - Date: 6/5/19     Client will use cognitive strategies identified in therapy to challenge anxious thoughts.    Intervention(s)  Therapist will teach emotional regulation skills. CBT/REBT ABCD  model.    Objective #B  Client will use at least 2 new coping skills for anxiety management in the next 12 weeks.    Status: New - Date: 6/5/19     Intervention(s)  Therapist will teach emotional regulation skills. DBT Core Mindfulness, Distress Tolerance, Emotion Regulation, and Interpersonal Effectiveness skills.    Client has reviewed and agreed to the above plan.      YAN Hooker  September 3, 2019

## 2019-09-04 ASSESSMENT — PATIENT HEALTH QUESTIONNAIRE - PHQ9: SUM OF ALL RESPONSES TO PHQ QUESTIONS 1-9: 0

## 2019-09-10 ENCOUNTER — OFFICE VISIT (OUTPATIENT)
Dept: PSYCHOLOGY | Facility: CLINIC | Age: 34
End: 2019-09-10

## 2019-09-10 DIAGNOSIS — F43.22 ADJUSTMENT DISORDER WITH ANXIOUS MOOD: Primary | ICD-10-CM

## 2019-09-10 PROCEDURE — 90834 PSYTX W PT 45 MINUTES: CPT | Performed by: MARRIAGE & FAMILY THERAPIST

## 2019-09-10 ASSESSMENT — PATIENT HEALTH QUESTIONNAIRE - PHQ9
10. IF YOU CHECKED OFF ANY PROBLEMS, HOW DIFFICULT HAVE THESE PROBLEMS MADE IT FOR YOU TO DO YOUR WORK, TAKE CARE OF THINGS AT HOME, OR GET ALONG WITH OTHER PEOPLE: NOT DIFFICULT AT ALL
SUM OF ALL RESPONSES TO PHQ QUESTIONS 1-9: 1
SUM OF ALL RESPONSES TO PHQ QUESTIONS 1-9: 1
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
## 2019-09-11 ASSESSMENT — PATIENT HEALTH QUESTIONNAIRE - PHQ9: SUM OF ALL RESPONSES TO PHQ QUESTIONS 1-9: 1

## 2019-09-27 ENCOUNTER — OFFICE VISIT (OUTPATIENT)
Dept: PSYCHOLOGY | Facility: CLINIC | Age: 34
End: 2019-09-27

## 2019-09-27 DIAGNOSIS — F43.22 ADJUSTMENT DISORDER WITH ANXIOUS MOOD: Primary | ICD-10-CM

## 2019-09-27 PROCEDURE — 90832 PSYTX W PT 30 MINUTES: CPT | Performed by: MARRIAGE & FAMILY THERAPIST

## 2019-09-27 ASSESSMENT — ANXIETY QUESTIONNAIRES
7. FEELING AFRAID AS IF SOMETHING AWFUL MIGHT HAPPEN: NOT AT ALL
7. FEELING AFRAID AS IF SOMETHING AWFUL MIGHT HAPPEN: NOT AT ALL
3. WORRYING TOO MUCH ABOUT DIFFERENT THINGS: NOT AT ALL
4. TROUBLE RELAXING: NOT AT ALL
GAD7 TOTAL SCORE: 0
6. BECOMING EASILY ANNOYED OR IRRITABLE: NOT AT ALL
GAD7 TOTAL SCORE: 0
1. FEELING NERVOUS, ANXIOUS, OR ON EDGE: NOT AT ALL
5. BEING SO RESTLESS THAT IT IS HARD TO SIT STILL: NOT AT ALL
2. NOT BEING ABLE TO STOP OR CONTROL WORRYING: NOT AT ALL
GAD7 TOTAL SCORE: 0

## 2019-09-27 NOTE — PROGRESS NOTES
Progress Note    Client Name: Kendrick Sosa  Date: 9/27/19         Service Type: Individual  Video Visit: No     Session Start Time: 2:00  Session End Time: 2:30     Session Length: 16-37    Session #: 12    Attendees: Client attended alone     Treatment Plan Last Reviewed: 9/1019, next due 12/10/19.  PHQ-9 / MILAD-7 : completed.    DATA  Interactive Complexity: No  Crisis: No       Progress Since Last Session (Related to Symptoms / Goals / Homework):   Symptoms: Improving client reported decreased PHQ-9 and MILAD-7 scores.    Homework: Partially completed       Episode of Care Goals: Satisfactory progress - ACTION (Actively working towards change); Intervened by reinforcing change plan / affirming steps taken        Current / Ongoing Stressors and Concerns:   Client reported experiencing significant financial stress.  Client reported that he was terminated from his iiqg-pr-gr-ex-wife's insurance, so he needs to pay out-of-pocket for his therapy at this time.  Client reported that his child support is also starting.     Treatment Objective(s) Addressed in This Session:   use cognitive strategies identified in therapy to challenge anxious thoughts      Intervention:   CBT: reviewed with client letting go of things outside of his control.        ASSESSMENT: Current Emotional / Mental Status (status of significant symptoms):   Risk status (Self / Other harm or suicidal ideation)   Client denies current fears or concerns for personal safety.   Client denies current or recent suicidal ideation or behaviors.   Client denies current or recent homicidal ideation or behaviors.   Client denies current or recent self injurious behavior or ideation.   Client denies other safety concerns.   Client Client reports there has been no change in risk factors since their last session.     Client Patient reports there has been no change in protective factors since their last session.     A safety  and risk management plan has not been developed at this time, however client was given the after-hours number / 911 should there be a change in any of these risk factors.     Appearance:   Appropriate    Eye Contact:   Good    Psychomotor Behavior: Normal    Attitude:   Cooperative    Orientation:   All   Speech    Rate / Production: Normal     Volume:  Normal    Mood:    Anxious  Normal   Affect:    Appropriate  Worrisome    Thought Content:  Clear    Thought Form:  Coherent  Logical    Insight:    Good      Medication Review:   No current psychiatric medications prescribed     Medication Compliance:   NA     Changes in Health Issues:   None reported     Chemical Use Review:   Substance Use: Chemical use reviewed, no active concerns identified      Tobacco Use: No change in amount of tobacco use since last session.  Action    Diagnosis:  1. Adjustment disorder with anxious mood        Collateral Reports Completed:   Not Applicable    PLAN: (Client Tasks / Therapist Tasks / Other)  Client agreed to continue to work on letting go of things outside of his control between now and follow-up session in two weeks.        Cali Navarro, [facility]                                                         ______________________________________________________________________    Treatment Plan    Client's Name: Kendrick Sosa  YOB: 1985    Date: 9/10/19    DSM-V Diagnoses: Adjustment Disorders  309.24 (F43.22) With anxiety  Psychosocial / Contextual Factors: going through divorce from wife, history of loss/trauma, unemployed/no income    WHODAS: 15    Referral / Collaboration:  Referral to another professional/service is not indicated at this time..    Anticipated number of session or this episode of care: 11-20      MeasurableTreatment Goal(s) related to diagnosis / functional impairment(s)  Goal 1: Client will successfully process through past trauma defined as reporting 0 Subjective Units of Distress related  to trauma on 0-10 scale and 7 on Validity of (positive) Cognition scale about self on 1-7 scale   I will know I've met my goal when I feel less impacted by my past loss/trauma..       Objective #A (Client Action)    Status: Continued - Date: 9/10/19      Client will identify past traumatic events/memories which are causing current distress.     Intervention(s)  Therapist will take client's history and facilitate client's identification of targets for EMDR.     Objective #B  Client will complete needed assessment(s) and [facility] resourcing to confirm readiness for EMDR.    Status: Continued - Date: 9/10/19      Intervention(s)  Therapist will administer Dissociative Experiences Scale, Multidimensional Inventory of Dissociation as needed and complete [facility] EM[facility] resourcing with client.     Objective #C  Client will engage in installing at least 2 EMDR resources.  Status: Continued - Date: 9/10/19      Intervention(s)  Therapist will complete EMDR resourcing (Container, Remote Control, grounding/progressive muscle relaxation, Light Stream, Inner Advisor) with client.     Objective #D (Client Action)    Status: Continued - Date: 9/10/19      Client will engage in reprocessing all past traumatic event/memory targets.     Intervention(s)   Therapist will complete EMDR reprocessing with client.    Goal 2: Client will increase overall baseline calm mindset by 2 points on a 1-10 Likert scale per self-report (10 = optimal calm mindset).    I will know I've met my goal when I feel less anxious.      Objective #A (Client Action)    Status: Continued - Date: 9/10/19     Client will use cognitive strategies identified in therapy to challenge anxious thoughts.    Intervention(s)  Therapist will teach emotional regulation skills. CBT/REBT ABCD model.    Objective #B  Client will use at least 2 new coping skills for anxiety management in the next 12 weeks.    Status: Continued - Date: 9/10/19      Intervention(s)  Therapist will teach emotional regulation skills. DBT Core Mindfulness, Distress Tolerance, Emotion Regulation, and Interpersonal Effectiveness skills.    Client has reviewed and agreed to the above plan.      Cali Navarro, LMFT  September 27, 2019

## 2019-09-28 ASSESSMENT — ANXIETY QUESTIONNAIRES: GAD7 TOTAL SCORE: 0

## 2019-09-28 ASSESSMENT — PATIENT HEALTH QUESTIONNAIRE - PHQ9: SUM OF ALL RESPONSES TO PHQ QUESTIONS 1-9: 0

## 2019-11-12 ENCOUNTER — OFFICE VISIT (OUTPATIENT)
Dept: PSYCHOLOGY | Facility: CLINIC | Age: 34
End: 2019-11-12

## 2019-11-12 DIAGNOSIS — F43.22 ADJUSTMENT DISORDER WITH ANXIOUS MOOD: Primary | ICD-10-CM

## 2019-11-12 PROCEDURE — 90832 PSYTX W PT 30 MINUTES: CPT | Performed by: MARRIAGE & FAMILY THERAPIST

## 2019-11-12 ASSESSMENT — ANXIETY QUESTIONNAIRES
5. BEING SO RESTLESS THAT IT IS HARD TO SIT STILL: SEVERAL DAYS
1. FEELING NERVOUS, ANXIOUS, OR ON EDGE: NOT AT ALL
7. FEELING AFRAID AS IF SOMETHING AWFUL MIGHT HAPPEN: NOT AT ALL
3. WORRYING TOO MUCH ABOUT DIFFERENT THINGS: NOT AT ALL
6. BECOMING EASILY ANNOYED OR IRRITABLE: NOT AT ALL
GAD7 TOTAL SCORE: 1
2. NOT BEING ABLE TO STOP OR CONTROL WORRYING: NOT AT ALL

## 2019-11-12 ASSESSMENT — PATIENT HEALTH QUESTIONNAIRE - PHQ9
5. POOR APPETITE OR OVEREATING: NOT AT ALL
SUM OF ALL RESPONSES TO PHQ QUESTIONS 1-9: 0

## 2019-11-12 NOTE — PROGRESS NOTES
Progress Note    Client Name: Kendrick Sosa  Date: 11/12/19         Service Type: Individual  Video Visit: No     Session Start Time: 12:00  Session End Time: 12:30     Session Length: 16-37    Session #: 13    Attendees: Client attended alone     Treatment Plan Last Reviewed: 9/1019, next due 12/10/19.  PHQ-9 / IMLAD-7 : completed.    DATA  Interactive Complexity: No  Crisis: No       Progress Since Last Session (Related to Symptoms / Goals / Homework):   Symptoms: Client reported no depression and minimal anxiety.     Homework: Achieved / completed to satisfaction       Episode of Care Goals: Satisfactory progress - ACTION (Actively working towards change); Intervened by reinforcing change plan / affirming steps taken        Current / Ongoing Stressors and Concerns:   Client reported continuing to experience significant financial stress.  Client reported that he has been unable to pay for insurance, and he has not yet attained full-time employment with benefits, so he continues to need to pay out-of-pocket for his therapy at this time.  Client reported that he is considering giving up his parental rights to his children due to the stress that contact with his ex-wife causes him.     Treatment Objective(s) Addressed in This Session:   use at least some coping skills for anxiety management in the next eight weeks      Intervention:   DBT: reviewed with client using Pros and Cons List skill regarding his options regarding his parental rights of his children.        ASSESSMENT: Current Emotional / Mental Status (status of significant symptoms):   Risk status (Self / Other harm or suicidal ideation)   Client denies current fears or concerns for personal safety.   Client denies current or recent suicidal ideation or behaviors.   Client denies current or recent homicidal ideation or behaviors.   Client denies current or recent self injurious behavior or ideation.   Client denies  other safety concerns.   Client Client reports there has been no change in risk factors since their last session.     Client Patient reports there has been no change in protective factors since their last session.     A safety and risk management plan has not been developed at this time, however client was given the after-hours number / 911 should there be a change in any of these risk factors.     Appearance:   Appropriate    Eye Contact:   Good    Psychomotor Behavior: Normal    Attitude:   Cooperative    Orientation:   All   Speech    Rate / Production: Normal     Volume:  Normal    Mood:    Anxious  Normal   Affect:    Appropriate  Worrisome    Thought Content:  Clear    Thought Form:  Coherent  Logical    Insight:    Good      Medication Review:   No current psychiatric medications prescribed     Medication Compliance:   NA     Changes in Health Issues:   None reported     Chemical Use Review:   Substance Use: Chemical use reviewed, no active concerns identified      Tobacco Use: No change in amount of tobacco use since last session.  Action    Diagnosis:  1. Adjustment disorder with anxious mood        Collateral Reports Completed:   Not Applicable    PLAN: (Client Tasks / Therapist Tasks / Other)  Client agreed to work on using Pros and Cons List skill to aid in his decision-making regarding his parental rights between now and follow-up session in eight weeks.        Cali Navarro, [facility]                                                         ______________________________________________________________________    Treatment Plan    Client's Name: Kendrick Sosa  YOB: 1985    Date: 9/10/19    DSM-V Diagnoses: Adjustment Disorders  309.24 (F43.22) With anxiety  Psychosocial / Contextual Factors: going through divorce from wife, history of loss/trauma, unemployed/no income    WHODAS: 15    Referral / Collaboration:  Referral to another professional/service is not indicated at this  time..    Anticipated number of session or this episode of care: 11-20      MeasurableTreatment Goal(s) related to diagnosis / functional impairment(s)  Goal 1: Client will successfully process through past trauma defined as reporting 0 Subjective Units of Distress related to trauma on 0-10 scale and 7 on Validity of (positive) Cognition scale about self on 1-7 scale   I will know I've met my goal when I feel less impacted by my past loss/trauma..       Objective #A (Client Action)    Status: Continued - Date: 9/10/19      Client will identify past traumatic events/memories which are causing current distress.     Intervention(s)  Therapist will take client's history and facilitate client's identification of targets for EMDR.     Objective #B  Client will complete needed assessment(s) and [facility] EMDR resourcing to confirm readiness for EMDR.    Status: Continued - Date: 9/10/19      Intervention(s)  Therapist will administer Dissociative Experiences Scale, Multidimensional Inventory of Dissociation as needed and complete [facility] EMDR resourcing with client.     Objective #C  Client will engage in installing at least 2 EMDR resources.  Status: Continued - Date: 9/10/19      Intervention(s)  Therapist will complete EMDR resourcing (Container, Remote Control, grounding/progressive muscle relaxation, Light Stream, Inner Advisor) with client.     Objective #D (Client Action)    Status: Continued - Date: 9/10/19      Client will engage in reprocessing all past traumatic event/memory targets.     Intervention(s)   Therapist will complete EMDR reprocessing with client.    Goal 2: Client will increase overall baseline calm mindset by 2 points on a 1-10 Likert scale per self-report (10 = optimal calm mindset).    I will know I've met my goal when I feel less anxious.      Objective #A (Client Action)    Status: Continued - Date: 9/10/19     Client will use cognitive strategies identified in therapy to challenge anxious  thoughts.    Intervention(s)  Therapist will teach emotional regulation skills. CBT/REBT ABCD model.    Objective #B  Client will use at least 2 new coping skills for anxiety management in the next 12 weeks.    Status: Continued - Date: 9/10/19     Intervention(s)  Therapist will teach emotional regulation skills. DBT Core Mindfulness, Distress Tolerance, Emotion Regulation, and Interpersonal Effectiveness skills.    Client has reviewed and agreed to the above plan.      YAN Hooker  November 12, 2019

## 2019-11-13 ASSESSMENT — ANXIETY QUESTIONNAIRES: GAD7 TOTAL SCORE: 1

## 2020-04-09 ENCOUNTER — TELEPHONE (OUTPATIENT)
Dept: FAMILY MEDICINE | Facility: CLINIC | Age: 35
End: 2020-04-09

## 2020-04-09 ENCOUNTER — VIRTUAL VISIT (OUTPATIENT)
Dept: FAMILY MEDICINE | Facility: CLINIC | Age: 35
End: 2020-04-09

## 2020-04-09 DIAGNOSIS — K04.7 TOOTH INFECTION: Primary | ICD-10-CM

## 2020-04-09 PROCEDURE — 99213 OFFICE O/P EST LOW 20 MIN: CPT | Mod: TEL | Performed by: FAMILY MEDICINE

## 2020-04-09 RX ORDER — AMOXICILLIN 875 MG
875 TABLET ORAL 2 TIMES DAILY
Qty: 20 TABLET | Refills: 0 | Status: SHIPPED | OUTPATIENT
Start: 2020-04-09 | End: 2020-04-19

## 2020-04-09 NOTE — TELEPHONE ENCOUNTER
Spoke with patient he has had tooth pain for a few days and feels pressure, he reports his bite isnt the same due to the swelling but he is not able to get into dentist at this time.  Due to COVID19 pandemic we are recommending online virtual visits through [email or web address]- patient agreeable to plan and will do virtual visit.   Edita Rebolledo RN

## 2020-04-09 NOTE — TELEPHONE ENCOUNTER
Reason for call:  Other     Patient called regarding (reason for call): prescription    Additional comments: Patient calling stating he has an infected tooth and is requesting antibiotics, please call to advise.     Phone number to reach patient:  Home number on file [phone] (home)    Best Time:  Any     Can we leave a detailed message on this number?  YES    Travel screening: Not Applicable

## 2020-04-09 NOTE — PROGRESS NOTES
"Subjective     Kendrick Sosa is a 34 year old male who is being evaluated via a billable telephone visit.      The patient has been notified of following:     \"This telephone visit will be conducted via a call between you and your physician/provider. We have found that certain health care needs can be provided without the need for a physical exam.  This service lets us provide the care you need with a short phone conversation.  If a prescription is necessary we can send it directly to your pharmacy.  If lab work is needed we can place an order for that and you can then stop by our lab to have the test done at a later time.    Telephone visits are billed at different rates depending on your insurance coverage. During this emergency period, for some insurers they may be billed the same as an in-person visit.  Please reach out to your insurance provider with any questions.    If during the course of the call the physician/provider feels a telephone visit is not appropriate, you will not be charged for this service.\"    Patient has given verbal consent for Telephone visit?  Yes    How would you like to obtain your AVS? E-Mail (inform patient AVS not encrypted)    Kendrick Sosa complains of No chief complaint on file.      ALLERGIES  Patient has no known allergies.    Concern - tooth pain  Onset: 2 day    Description:   Swollen (left)     Intensity: severe    Progression of Symptoms:  worsening    Accompanying Signs & Symptoms:      Previous history of similar problem:      Precipitating factors:   Worsened by: cant close jaw    Alleviating factors:  Improved by: advil    Therapies Tried and outcome:     2:46 PM  Patient reports tooth pain for 2 days  Location: Left side bottom row  Has really bad teeth and has had infections in the past.  He feels like is one tooth  Reviewed chart for allergies  2:54 PM    Assessment/Plan:  1. Tooth infection     Discussed role of antibiotic with tooth infection, will need follow up with " dentist for evaluation. Oral hygiene emphasized and continue ibuprofen at this time for pain.   - amoxicillin (AMOXIL) 875 MG tablet; Take 1 tablet (875 mg) by mouth 2 times daily for 10 days  Dispense: 20 tablet; Refill: 0    Phone call duration: 8 minutes    Liu York MD